# Patient Record
Sex: FEMALE | Race: BLACK OR AFRICAN AMERICAN | Employment: STUDENT | URBAN - METROPOLITAN AREA
[De-identification: names, ages, dates, MRNs, and addresses within clinical notes are randomized per-mention and may not be internally consistent; named-entity substitution may affect disease eponyms.]

---
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
## 2023-02-14 ENCOUNTER — HOSPITAL ENCOUNTER (EMERGENCY)
Facility: HOSPITAL | Age: 18
Discharge: HOME | End: 2023-02-14
Attending: EMERGENCY MEDICINE
Payer: MEDICAID

## 2023-02-14 ENCOUNTER — APPOINTMENT (OUTPATIENT)
Dept: GENERAL RADIOLOGY | Facility: HOSPITAL | Age: 18
End: 2023-02-14
Attending: EMERGENCY MEDICINE
Payer: MEDICAID

## 2023-02-14 VITALS
TEMPERATURE: 98 F | HEART RATE: 96 BPM | SYSTOLIC BLOOD PRESSURE: 121 MMHG | BODY MASS INDEX: 27 KG/M2 | OXYGEN SATURATION: 98 % | HEIGHT: 62 IN | DIASTOLIC BLOOD PRESSURE: 56 MMHG | RESPIRATION RATE: 18 BRPM

## 2023-02-14 DIAGNOSIS — R11.15 CYCLIC VOMITING SYNDROME: ICD-10-CM

## 2023-02-14 DIAGNOSIS — F12.188 CANNABIS HYPEREMESIS SYNDROME CONCURRENT WITH AND DUE TO CANNABIS ABUSE (HCC): ICD-10-CM

## 2023-02-14 DIAGNOSIS — E87.6 HYPOKALEMIA: ICD-10-CM

## 2023-02-14 DIAGNOSIS — R10.13 ABDOMINAL PAIN, EPIGASTRIC: Primary | ICD-10-CM

## 2023-02-14 LAB
ALBUMIN SERPL-MCNC: 4.9 G/DL (ref 3.4–5)
ALBUMIN/GLOB SERPL: 1.2 {RATIO} (ref 1–2)
ALP LIVER SERPL-CCNC: 108 U/L
ALT SERPL-CCNC: 29 U/L
AMPHET UR QL SCN: NEGATIVE
ANION GAP SERPL CALC-SCNC: 8 MMOL/L (ref 0–18)
AST SERPL-CCNC: 25 U/L (ref 15–37)
B-HCG UR QL: NEGATIVE
BASOPHILS # BLD AUTO: 0.03 X10(3) UL (ref 0–0.2)
BASOPHILS NFR BLD AUTO: 0.3 %
BENZODIAZ UR QL SCN: NEGATIVE
BILIRUB SERPL-MCNC: 0.8 MG/DL (ref 0.1–2)
BILIRUB UR QL STRIP.AUTO: NEGATIVE
BUN BLD-MCNC: 12 MG/DL (ref 7–18)
CALCIUM BLD-MCNC: 10 MG/DL (ref 8.5–10.1)
CHLORIDE SERPL-SCNC: 102 MMOL/L (ref 98–112)
CO2 SERPL-SCNC: 30 MMOL/L (ref 21–32)
COCAINE UR QL: NEGATIVE
COLOR UR AUTO: YELLOW
CREAT BLD-MCNC: 0.82 MG/DL
CREAT UR-SCNC: 646 MG/DL
EOSINOPHIL # BLD AUTO: 0 X10(3) UL (ref 0–0.7)
EOSINOPHIL NFR BLD AUTO: 0 %
ERYTHROCYTE [DISTWIDTH] IN BLOOD BY AUTOMATED COUNT: 13.4 %
GFR SERPLBLD BASED ON 1.73 SQ M-ARVRAT: 106 ML/MIN/1.73M2 (ref 60–?)
GLOBULIN PLAS-MCNC: 4.1 G/DL (ref 2.8–4.4)
GLUCOSE BLD-MCNC: 104 MG/DL (ref 70–99)
GLUCOSE UR STRIP.AUTO-MCNC: NEGATIVE MG/DL
HCT VFR BLD AUTO: 40.6 %
HGB BLD-MCNC: 13.7 G/DL
HYALINE CASTS #/AREA URNS AUTO: PRESENT /LPF
IMM GRANULOCYTES # BLD AUTO: 0.06 X10(3) UL (ref 0–1)
IMM GRANULOCYTES NFR BLD: 0.5 %
KETONES UR STRIP.AUTO-MCNC: 80 MG/DL
LEUKOCYTE ESTERASE UR QL STRIP.AUTO: NEGATIVE
LIPASE SERPL-CCNC: 16 U/L (ref 13–75)
LIPASE SERPL-CCNC: 67 U/L (ref 73–393)
LYMPHOCYTES # BLD AUTO: 0.89 X10(3) UL (ref 1.5–5)
LYMPHOCYTES NFR BLD AUTO: 7.5 %
MCH RBC QN AUTO: 29.3 PG (ref 26–34)
MCHC RBC AUTO-ENTMCNC: 33.7 G/DL (ref 31–37)
MCV RBC AUTO: 86.9 FL
MDMA UR QL SCN: NEGATIVE
MONOCYTES # BLD AUTO: 0.68 X10(3) UL (ref 0.1–1)
MONOCYTES NFR BLD AUTO: 5.8 %
NEUTROPHILS # BLD AUTO: 10.16 X10 (3) UL (ref 1.5–7.7)
NEUTROPHILS # BLD AUTO: 10.16 X10(3) UL (ref 1.5–7.7)
NEUTROPHILS NFR BLD AUTO: 85.9 %
NITRITE UR QL STRIP.AUTO: NEGATIVE
OPIATES UR QL SCN: NEGATIVE
OSMOLALITY SERPL CALC.SUM OF ELEC: 290 MOSM/KG (ref 275–295)
OXYCODONE UR QL SCN: NEGATIVE
PH UR STRIP.AUTO: 6 [PH] (ref 5–8)
PLATELET # BLD AUTO: 311 10(3)UL (ref 150–450)
POTASSIUM SERPL-SCNC: 3 MMOL/L (ref 3.5–5.1)
PROT SERPL-MCNC: 9 G/DL (ref 6.4–8.2)
PROT UR STRIP.AUTO-MCNC: >=500 MG/DL
RBC # BLD AUTO: 4.67 X10(6)UL
RBC UR QL AUTO: NEGATIVE
SODIUM SERPL-SCNC: 140 MMOL/L (ref 136–145)
SP GR UR STRIP.AUTO: >1.03 (ref 1–1.03)
UROBILINOGEN UR STRIP.AUTO-MCNC: 2 MG/DL
WBC # BLD AUTO: 11.8 X10(3) UL (ref 4–11)

## 2023-02-14 PROCEDURE — 83690 ASSAY OF LIPASE: CPT | Performed by: EMERGENCY MEDICINE

## 2023-02-14 PROCEDURE — 96361 HYDRATE IV INFUSION ADD-ON: CPT

## 2023-02-14 PROCEDURE — 81025 URINE PREGNANCY TEST: CPT

## 2023-02-14 PROCEDURE — C9113 INJ PANTOPRAZOLE SODIUM, VIA: HCPCS | Performed by: EMERGENCY MEDICINE

## 2023-02-14 PROCEDURE — 99285 EMERGENCY DEPT VISIT HI MDM: CPT

## 2023-02-14 PROCEDURE — 85025 COMPLETE CBC W/AUTO DIFF WBC: CPT | Performed by: EMERGENCY MEDICINE

## 2023-02-14 PROCEDURE — 80349 CANNABINOIDS NATURAL: CPT | Performed by: EMERGENCY MEDICINE

## 2023-02-14 PROCEDURE — 96375 TX/PRO/DX INJ NEW DRUG ADDON: CPT

## 2023-02-14 PROCEDURE — 80053 COMPREHEN METABOLIC PANEL: CPT | Performed by: EMERGENCY MEDICINE

## 2023-02-14 PROCEDURE — 99284 EMERGENCY DEPT VISIT MOD MDM: CPT

## 2023-02-14 PROCEDURE — 81001 URINALYSIS AUTO W/SCOPE: CPT | Performed by: EMERGENCY MEDICINE

## 2023-02-14 PROCEDURE — 80307 DRUG TEST PRSMV CHEM ANLYZR: CPT | Performed by: EMERGENCY MEDICINE

## 2023-02-14 PROCEDURE — 74019 RADEX ABDOMEN 2 VIEWS: CPT | Performed by: EMERGENCY MEDICINE

## 2023-02-14 PROCEDURE — 96374 THER/PROPH/DIAG INJ IV PUSH: CPT

## 2023-02-14 RX ORDER — HALOPERIDOL 5 MG/ML
5 INJECTION INTRAMUSCULAR ONCE
Status: COMPLETED | OUTPATIENT
Start: 2023-02-14 | End: 2023-02-14

## 2023-02-14 RX ORDER — DIPHENHYDRAMINE HYDROCHLORIDE 50 MG/ML
50 INJECTION INTRAMUSCULAR; INTRAVENOUS ONCE
Status: COMPLETED | OUTPATIENT
Start: 2023-02-14 | End: 2023-02-14

## 2023-02-14 RX ORDER — POTASSIUM CHLORIDE 1.5 G/1.77G
40 POWDER, FOR SOLUTION ORAL ONCE
Status: COMPLETED | OUTPATIENT
Start: 2023-02-14 | End: 2023-02-14

## 2023-02-14 RX ORDER — ONDANSETRON 4 MG/1
4 TABLET, ORALLY DISINTEGRATING ORAL EVERY 8 HOURS PRN
Qty: 10 TABLET | Refills: 0 | Status: SHIPPED | OUTPATIENT
Start: 2023-02-14 | End: 2023-02-21

## 2023-02-14 RX ORDER — ONDANSETRON 2 MG/ML
4 INJECTION INTRAMUSCULAR; INTRAVENOUS ONCE
Status: COMPLETED | OUTPATIENT
Start: 2023-02-14 | End: 2023-02-14

## 2023-02-15 NOTE — DISCHARGE INSTRUCTIONS
Refrain from any exposure to marijuana or any THC containing products. Zofran as needed for nausea or vomiting. Push fluids and rest.  Follow-up with PMD as needed. Return immediately if increased concerns.

## 2023-02-15 NOTE — ED INITIAL ASSESSMENT (HPI)
Pt presents to the ED with N/V x 1 week. Pt states she smoked week last week and has been sick since then. Pt states she has not smoked since last week.

## 2023-02-15 NOTE — ED QUICK NOTES
Assumed care of patient at this time, Pt resting comfortably in bed, all needs met. Will continue to monitor.

## 2023-02-19 LAB — DRUG CONFIRMATION,CANNABINOIDS: > 5000

## 2023-05-29 ENCOUNTER — APPOINTMENT (OUTPATIENT)
Dept: GENERAL RADIOLOGY | Facility: HOSPITAL | Age: 18
End: 2023-05-29
Attending: EMERGENCY MEDICINE
Payer: MEDICAID

## 2023-05-29 ENCOUNTER — HOSPITAL ENCOUNTER (EMERGENCY)
Facility: HOSPITAL | Age: 18
Discharge: HOME | End: 2023-05-29
Attending: EMERGENCY MEDICINE
Payer: MEDICAID

## 2023-05-29 VITALS
DIASTOLIC BLOOD PRESSURE: 53 MMHG | SYSTOLIC BLOOD PRESSURE: 137 MMHG | RESPIRATION RATE: 18 BRPM | TEMPERATURE: 97 F | HEART RATE: 77 BPM | OXYGEN SATURATION: 99 %

## 2023-05-29 DIAGNOSIS — E86.0 DEHYDRATION: ICD-10-CM

## 2023-05-29 DIAGNOSIS — R11.2 NAUSEA AND VOMITING IN ADULT: Primary | ICD-10-CM

## 2023-05-29 LAB
ALBUMIN SERPL-MCNC: 4.8 G/DL (ref 3.4–5)
ALBUMIN/GLOB SERPL: 1.2 {RATIO} (ref 1–2)
ALP LIVER SERPL-CCNC: 103 U/L
ALT SERPL-CCNC: 24 U/L
ANION GAP SERPL CALC-SCNC: 9 MMOL/L (ref 0–18)
AST SERPL-CCNC: 24 U/L (ref 15–37)
BASOPHILS # BLD AUTO: 0.06 X10(3) UL (ref 0–0.2)
BASOPHILS NFR BLD AUTO: 0.5 %
BILIRUB SERPL-MCNC: 0.7 MG/DL (ref 0.1–2)
BILIRUB UR QL STRIP.AUTO: NEGATIVE
BUN BLD-MCNC: 10 MG/DL (ref 7–18)
CALCIUM BLD-MCNC: 9.8 MG/DL (ref 8.5–10.1)
CHLORIDE SERPL-SCNC: 104 MMOL/L (ref 98–112)
CO2 SERPL-SCNC: 27 MMOL/L (ref 21–32)
COLOR UR AUTO: YELLOW
CREAT BLD-MCNC: 0.79 MG/DL
EOSINOPHIL # BLD AUTO: 0 X10(3) UL (ref 0–0.7)
EOSINOPHIL NFR BLD AUTO: 0 %
ERYTHROCYTE [DISTWIDTH] IN BLOOD BY AUTOMATED COUNT: 13.1 %
GFR SERPLBLD BASED ON 1.73 SQ M-ARVRAT: 110 ML/MIN/1.73M2 (ref 60–?)
GLOBULIN PLAS-MCNC: 4 G/DL (ref 2.8–4.4)
GLUCOSE BLD-MCNC: 126 MG/DL (ref 70–99)
GLUCOSE UR STRIP.AUTO-MCNC: NEGATIVE MG/DL
HCT VFR BLD AUTO: 41.4 %
HGB BLD-MCNC: 13.7 G/DL
IMM GRANULOCYTES # BLD AUTO: 0.04 X10(3) UL (ref 0–1)
IMM GRANULOCYTES NFR BLD: 0.3 %
KETONES UR STRIP.AUTO-MCNC: 20 MG/DL
LEUKOCYTE ESTERASE UR QL STRIP.AUTO: NEGATIVE
LIPASE SERPL-CCNC: 19 U/L (ref 13–75)
LYMPHOCYTES # BLD AUTO: 1.12 X10(3) UL (ref 1.5–5)
LYMPHOCYTES NFR BLD AUTO: 8.4 %
MCH RBC QN AUTO: 28.8 PG (ref 26–34)
MCHC RBC AUTO-ENTMCNC: 33.1 G/DL (ref 31–37)
MCV RBC AUTO: 87 FL
MONOCYTES # BLD AUTO: 0.37 X10(3) UL (ref 0.1–1)
MONOCYTES NFR BLD AUTO: 2.8 %
NEUTROPHILS # BLD AUTO: 11.7 X10 (3) UL (ref 1.5–7.7)
NEUTROPHILS # BLD AUTO: 11.7 X10(3) UL (ref 1.5–7.7)
NEUTROPHILS NFR BLD AUTO: 88 %
NITRITE UR QL STRIP.AUTO: NEGATIVE
OSMOLALITY SERPL CALC.SUM OF ELEC: 291 MOSM/KG (ref 275–295)
PH UR STRIP.AUTO: 6 [PH] (ref 5–8)
PLATELET # BLD AUTO: 359 10(3)UL (ref 150–450)
POTASSIUM SERPL-SCNC: 3.1 MMOL/L (ref 3.5–5.1)
PROT SERPL-MCNC: 8.8 G/DL (ref 6.4–8.2)
PROT UR STRIP.AUTO-MCNC: 100 MG/DL
RBC # BLD AUTO: 4.76 X10(6)UL
SODIUM SERPL-SCNC: 140 MMOL/L (ref 136–145)
SP GR UR STRIP.AUTO: >1.03 (ref 1–1.03)
UROBILINOGEN UR STRIP.AUTO-MCNC: 4 MG/DL
WBC # BLD AUTO: 13.3 X10(3) UL (ref 4–11)

## 2023-05-29 PROCEDURE — 80053 COMPREHEN METABOLIC PANEL: CPT | Performed by: EMERGENCY MEDICINE

## 2023-05-29 PROCEDURE — 81001 URINALYSIS AUTO W/SCOPE: CPT | Performed by: EMERGENCY MEDICINE

## 2023-05-29 PROCEDURE — 83690 ASSAY OF LIPASE: CPT | Performed by: EMERGENCY MEDICINE

## 2023-05-29 PROCEDURE — 96375 TX/PRO/DX INJ NEW DRUG ADDON: CPT

## 2023-05-29 PROCEDURE — 96361 HYDRATE IV INFUSION ADD-ON: CPT

## 2023-05-29 PROCEDURE — 74021 RADEX ABDOMEN 3+ VIEWS: CPT | Performed by: EMERGENCY MEDICINE

## 2023-05-29 PROCEDURE — 99285 EMERGENCY DEPT VISIT HI MDM: CPT

## 2023-05-29 PROCEDURE — 85025 COMPLETE CBC W/AUTO DIFF WBC: CPT | Performed by: EMERGENCY MEDICINE

## 2023-05-29 PROCEDURE — 96374 THER/PROPH/DIAG INJ IV PUSH: CPT

## 2023-05-29 RX ORDER — METOCLOPRAMIDE 10 MG/1
10 TABLET ORAL EVERY 6 HOURS PRN
Qty: 20 TABLET | Refills: 0 | Status: SHIPPED | OUTPATIENT
Start: 2023-05-29 | End: 2023-06-28

## 2023-05-29 RX ORDER — DIPHENHYDRAMINE HYDROCHLORIDE 50 MG/ML
50 INJECTION INTRAMUSCULAR; INTRAVENOUS ONCE
Status: COMPLETED | OUTPATIENT
Start: 2023-05-29 | End: 2023-05-29

## 2023-05-29 RX ORDER — ONDANSETRON 8 MG/1
8 TABLET, ORALLY DISINTEGRATING ORAL EVERY 6 HOURS PRN
Qty: 10 TABLET | Refills: 0 | Status: SHIPPED | OUTPATIENT
Start: 2023-05-29

## 2023-05-29 RX ORDER — ONDANSETRON 2 MG/ML
4 INJECTION INTRAMUSCULAR; INTRAVENOUS ONCE
Status: DISCONTINUED | OUTPATIENT
Start: 2023-05-29 | End: 2023-05-29

## 2023-05-29 RX ORDER — POTASSIUM CHLORIDE 20 MEQ/1
20 TABLET, EXTENDED RELEASE ORAL ONCE
Status: COMPLETED | OUTPATIENT
Start: 2023-05-29 | End: 2023-05-29

## 2023-05-29 RX ORDER — POTASSIUM CHLORIDE 14.9 MG/ML
20 INJECTION INTRAVENOUS ONCE
Status: DISCONTINUED | OUTPATIENT
Start: 2023-05-29 | End: 2023-05-29

## 2023-05-29 RX ORDER — ONDANSETRON 2 MG/ML
8 INJECTION INTRAMUSCULAR; INTRAVENOUS
Status: DISCONTINUED | OUTPATIENT
Start: 2023-05-29 | End: 2023-05-29

## 2023-05-29 RX ORDER — DEXTROSE, SODIUM CHLORIDE, SODIUM LACTATE, POTASSIUM CHLORIDE, AND CALCIUM CHLORIDE 5; .6; .31; .03; .02 G/100ML; G/100ML; G/100ML; G/100ML; G/100ML
INJECTION, SOLUTION INTRAVENOUS CONTINUOUS
Status: DISCONTINUED | OUTPATIENT
Start: 2023-05-29 | End: 2023-05-29

## 2023-05-29 RX ORDER — METOCLOPRAMIDE HYDROCHLORIDE 5 MG/ML
10 INJECTION INTRAMUSCULAR; INTRAVENOUS ONCE
Status: COMPLETED | OUTPATIENT
Start: 2023-05-29 | End: 2023-05-29

## 2023-05-29 NOTE — ED QUICK NOTES
Pt removed blood pressure cuff and stated she didn't like the way it felt. Pt educated on importance of monitoring vital signs while in ED. Pt declining to have vital sign monitoring in place at this time.

## 2023-05-29 NOTE — ED QUICK NOTES
Pt reports she is feeling better, no longer nauseous. Spoke with GEOVANNY regarding potassium.  Orders received from GEOVANNY.

## 2023-05-30 NOTE — DISCHARGE INSTRUCTIONS
Push fluids-sports drinks like Pedialyte or Gatorade are good choice  [place] light diet as tolerated  Alternate between Zofran and Reglan every 3-4 hours around-the-clock for the next 24 hours, then as needed  Never used marijuana    Contact your primary care doctor or the primary care doctor on-call in the morning to arrange close follow-up

## 2023-06-27 NOTE — ED QUICK NOTES
Pt noted to be drinking water out of sink in room and is now vomiting. Pt instructed not to drink water out of sink. Pt agreeable to receive IV zofran. No

## 2024-02-04 ENCOUNTER — HOSPITAL ENCOUNTER (INPATIENT)
Facility: HOSPITAL | Age: 19
LOS: 3 days | Discharge: HOME | End: 2024-02-07
Attending: PEDIATRICS | Admitting: PEDIATRICS
Payer: MEDICAID

## 2024-02-04 ENCOUNTER — APPOINTMENT (OUTPATIENT)
Dept: GENERAL RADIOLOGY | Facility: HOSPITAL | Age: 19
End: 2024-02-04
Attending: PEDIATRICS
Payer: MEDICAID

## 2024-02-04 ENCOUNTER — HOSPITAL ENCOUNTER (INPATIENT)
Facility: HOSPITAL | Age: 19
End: 2024-02-04
Attending: PEDIATRICS | Admitting: PEDIATRICS
Payer: MEDICAID

## 2024-02-04 ENCOUNTER — APPOINTMENT (OUTPATIENT)
Dept: CT IMAGING | Facility: HOSPITAL | Age: 19
End: 2024-02-04
Attending: PEDIATRICS
Payer: MEDICAID

## 2024-02-04 DIAGNOSIS — J93.11 PRIMARY SPONTANEOUS PNEUMOTHORAX: Primary | ICD-10-CM

## 2024-02-04 DIAGNOSIS — F12.90 CANNABINOID HYPEREMESIS SYNDROME: ICD-10-CM

## 2024-02-04 DIAGNOSIS — R11.2 CANNABINOID HYPEREMESIS SYNDROME: ICD-10-CM

## 2024-02-04 DIAGNOSIS — J98.2 PNEUMOMEDIASTINUM (HCC): ICD-10-CM

## 2024-02-04 LAB
ALBUMIN SERPL-MCNC: 4.8 G/DL (ref 3.4–5)
ALBUMIN/GLOB SERPL: 1.2 {RATIO} (ref 1–2)
ALP LIVER SERPL-CCNC: 84 U/L
ALT SERPL-CCNC: 23 U/L
ANION GAP SERPL CALC-SCNC: 8 MMOL/L (ref 0–18)
AST SERPL-CCNC: 17 U/L (ref 15–37)
ATRIAL RATE: 87 BPM
BASOPHILS # BLD AUTO: 0.03 X10(3) UL (ref 0–0.2)
BASOPHILS NFR BLD AUTO: 0.3 %
BILIRUB SERPL-MCNC: 1.6 MG/DL (ref 0.1–2)
BILIRUB UR QL STRIP.AUTO: NEGATIVE
BUN BLD-MCNC: 15 MG/DL (ref 9–23)
CALCIUM BLD-MCNC: 9.9 MG/DL (ref 8.5–10.1)
CHLORIDE SERPL-SCNC: 95 MMOL/L (ref 98–112)
CLARITY UR REFRACT.AUTO: CLEAR
CO2 SERPL-SCNC: 30 MMOL/L (ref 21–32)
COLOR UR AUTO: YELLOW
CREAT BLD-MCNC: 0.74 MG/DL
EGFRCR SERPLBLD CKD-EPI 2021: 119 ML/MIN/1.73M2 (ref 60–?)
EOSINOPHIL # BLD AUTO: 0 X10(3) UL (ref 0–0.7)
EOSINOPHIL NFR BLD AUTO: 0 %
ERYTHROCYTE [DISTWIDTH] IN BLOOD BY AUTOMATED COUNT: 12.5 %
FLUAV + FLUBV RNA SPEC NAA+PROBE: NEGATIVE
FLUAV + FLUBV RNA SPEC NAA+PROBE: NEGATIVE
GLOBULIN PLAS-MCNC: 4 G/DL (ref 2.8–4.4)
GLUCOSE BLD-MCNC: 96 MG/DL (ref 70–99)
GLUCOSE UR STRIP.AUTO-MCNC: NORMAL MG/DL
HCG SERPL QL: NEGATIVE
HCT VFR BLD AUTO: 45.4 %
HGB BLD-MCNC: 15.2 G/DL
IMM GRANULOCYTES # BLD AUTO: 0.04 X10(3) UL (ref 0–1)
IMM GRANULOCYTES NFR BLD: 0.4 %
KETONES UR STRIP.AUTO-MCNC: >150 MG/DL
LEUKOCYTE ESTERASE UR QL STRIP.AUTO: NEGATIVE
LIPASE SERPL-CCNC: 37 U/L (ref 13–75)
LYMPHOCYTES # BLD AUTO: 1.24 X10(3) UL (ref 1–4)
LYMPHOCYTES NFR BLD AUTO: 11.6 %
MCH RBC QN AUTO: 29.1 PG (ref 26–34)
MCHC RBC AUTO-ENTMCNC: 33.5 G/DL (ref 31–37)
MCV RBC AUTO: 86.8 FL
MONOCYTES # BLD AUTO: 0.97 X10(3) UL (ref 0.1–1)
MONOCYTES NFR BLD AUTO: 9.1 %
NEUTROPHILS # BLD AUTO: 8.42 X10 (3) UL (ref 1.5–7.7)
NEUTROPHILS # BLD AUTO: 8.42 X10(3) UL (ref 1.5–7.7)
NEUTROPHILS NFR BLD AUTO: 78.6 %
NITRITE UR QL STRIP.AUTO: NEGATIVE
OSMOLALITY SERPL CALC.SUM OF ELEC: 277 MOSM/KG (ref 275–295)
P AXIS: 20 DEGREES
P-R INTERVAL: 106 MS
PH UR STRIP.AUTO: 6 [PH] (ref 5–8)
PLATELET # BLD AUTO: 319 10(3)UL (ref 150–450)
POTASSIUM SERPL-SCNC: 3.1 MMOL/L (ref 3.5–5.1)
PROT SERPL-MCNC: 8.8 G/DL (ref 6.4–8.2)
PROT UR STRIP.AUTO-MCNC: 100 MG/DL
Q-T INTERVAL: 356 MS
QRS DURATION: 92 MS
QTC CALCULATION (BEZET): 428 MS
R AXIS: 50 DEGREES
RBC # BLD AUTO: 5.23 X10(6)UL
RBC #/AREA URNS AUTO: >10 /HPF
RSV RNA SPEC NAA+PROBE: NEGATIVE
SARS-COV-2 RNA RESP QL NAA+PROBE: NOT DETECTED
SODIUM SERPL-SCNC: 133 MMOL/L (ref 136–145)
SP GR UR STRIP.AUTO: >1.03 (ref 1–1.03)
T AXIS: 17 DEGREES
UROBILINOGEN UR STRIP.AUTO-MCNC: 2 MG/DL
VENTRICULAR RATE: 87 BPM
WBC # BLD AUTO: 10.7 X10(3) UL (ref 4–11)

## 2024-02-04 PROCEDURE — 74176 CT ABD & PELVIS W/O CONTRAST: CPT | Performed by: PEDIATRICS

## 2024-02-04 PROCEDURE — 99222 1ST HOSP IP/OBS MODERATE 55: CPT | Performed by: STUDENT IN AN ORGANIZED HEALTH CARE EDUCATION/TRAINING PROGRAM

## 2024-02-04 PROCEDURE — 71045 X-RAY EXAM CHEST 1 VIEW: CPT | Performed by: PEDIATRICS

## 2024-02-04 RX ORDER — LORAZEPAM 2 MG/ML
2 INJECTION INTRAMUSCULAR ONCE
Status: COMPLETED | OUTPATIENT
Start: 2024-02-04 | End: 2024-02-04

## 2024-02-04 RX ORDER — ONDANSETRON 2 MG/ML
4 INJECTION INTRAMUSCULAR; INTRAVENOUS ONCE
Status: COMPLETED | OUTPATIENT
Start: 2024-02-04 | End: 2024-02-04

## 2024-02-04 RX ORDER — POLYETHYLENE GLYCOL 3350 17 G/17G
17 POWDER, FOR SOLUTION ORAL DAILY PRN
Status: DISCONTINUED | OUTPATIENT
Start: 2024-02-04 | End: 2024-02-07

## 2024-02-04 RX ORDER — ECHINACEA PURPUREA EXTRACT 125 MG
1 TABLET ORAL
Status: DISCONTINUED | OUTPATIENT
Start: 2024-02-04 | End: 2024-02-07

## 2024-02-04 RX ORDER — ENEMA 19; 7 G/133ML; G/133ML
1 ENEMA RECTAL ONCE AS NEEDED
Status: DISCONTINUED | OUTPATIENT
Start: 2024-02-04 | End: 2024-02-07

## 2024-02-04 RX ORDER — ONDANSETRON 2 MG/ML
4 INJECTION INTRAMUSCULAR; INTRAVENOUS EVERY 6 HOURS PRN
Status: DISCONTINUED | OUTPATIENT
Start: 2024-02-04 | End: 2024-02-07

## 2024-02-04 RX ORDER — MELATONIN
3 NIGHTLY PRN
Status: DISCONTINUED | OUTPATIENT
Start: 2024-02-04 | End: 2024-02-07

## 2024-02-04 RX ORDER — KETOROLAC TROMETHAMINE 30 MG/ML
30 INJECTION, SOLUTION INTRAMUSCULAR; INTRAVENOUS ONCE
Status: COMPLETED | OUTPATIENT
Start: 2024-02-04 | End: 2024-02-04

## 2024-02-04 RX ORDER — ACETAMINOPHEN 500 MG
500 TABLET ORAL EVERY 4 HOURS PRN
Status: DISCONTINUED | OUTPATIENT
Start: 2024-02-04 | End: 2024-02-07

## 2024-02-04 RX ORDER — SENNOSIDES 8.6 MG
17.2 TABLET ORAL NIGHTLY PRN
Status: DISCONTINUED | OUTPATIENT
Start: 2024-02-04 | End: 2024-02-07

## 2024-02-04 RX ORDER — HEPARIN SODIUM 5000 [USP'U]/ML
5000 INJECTION, SOLUTION INTRAVENOUS; SUBCUTANEOUS EVERY 8 HOURS SCHEDULED
Status: DISCONTINUED | OUTPATIENT
Start: 2024-02-04 | End: 2024-02-07

## 2024-02-04 RX ORDER — BENZONATATE 100 MG/1
200 CAPSULE ORAL 3 TIMES DAILY PRN
Status: DISCONTINUED | OUTPATIENT
Start: 2024-02-04 | End: 2024-02-07

## 2024-02-04 RX ORDER — PROCHLORPERAZINE EDISYLATE 5 MG/ML
5 INJECTION INTRAMUSCULAR; INTRAVENOUS EVERY 8 HOURS PRN
Status: DISCONTINUED | OUTPATIENT
Start: 2024-02-04 | End: 2024-02-04

## 2024-02-04 RX ORDER — DIPHENHYDRAMINE HYDROCHLORIDE 50 MG/ML
25 INJECTION INTRAMUSCULAR; INTRAVENOUS EVERY 6 HOURS PRN
Status: DISCONTINUED | OUTPATIENT
Start: 2024-02-04 | End: 2024-02-07

## 2024-02-04 RX ORDER — BISACODYL 10 MG
10 SUPPOSITORY, RECTAL RECTAL
Status: DISCONTINUED | OUTPATIENT
Start: 2024-02-04 | End: 2024-02-07

## 2024-02-04 RX ORDER — POTASSIUM CHLORIDE 20 MEQ/1
40 TABLET, EXTENDED RELEASE ORAL ONCE
Status: COMPLETED | OUTPATIENT
Start: 2024-02-04 | End: 2024-02-04

## 2024-02-04 RX ORDER — SODIUM CHLORIDE 9 MG/ML
INJECTION, SOLUTION INTRAVENOUS CONTINUOUS
Status: DISCONTINUED | OUTPATIENT
Start: 2024-02-04 | End: 2024-02-07

## 2024-02-04 RX ORDER — MAGNESIUM GLYCINATE 100 MG
1 CAPSULE ORAL DAILY
COMMUNITY

## 2024-02-04 RX ORDER — PROCHLORPERAZINE EDISYLATE 5 MG/ML
10 INJECTION INTRAMUSCULAR; INTRAVENOUS EVERY 6 HOURS PRN
Status: DISCONTINUED | OUTPATIENT
Start: 2024-02-04 | End: 2024-02-04

## 2024-02-04 RX ORDER — METOCLOPRAMIDE HYDROCHLORIDE 5 MG/ML
5 INJECTION INTRAMUSCULAR; INTRAVENOUS EVERY 8 HOURS
Status: DISCONTINUED | OUTPATIENT
Start: 2024-02-04 | End: 2024-02-07

## 2024-02-04 NOTE — ED PROVIDER NOTES
Patient Seen in: [facility] Emergency Department      History     Chief Complaint   Patient presents with    Nausea/Vomiting/Diarrhea     Stated Complaint: vomiting and CP    Subjective:   HPI    18-year-old female with cannabinoid hyperemesis syndrome who is here with 5-day history of vomiting, about 20 times per day.  No associated fevers, diarrhea, or URI symptoms.  Does admit to smoking or vaping almost every day.  Multiple prior ED visits for the same and she was admitted in August 2022 for vomiting as well, no which was able to review.  Complaining of diffuse abdominal pain.  LMP 1.5 months ago.  No urinary or vaginal complaints.    Objective:   Past Medical History:   Diagnosis Date    Cannabis hyperemesis syndrome concurrent with and due to cannabis abuse (HCC)     Cyclic vomiting syndrome     Development delay     Failure to thrive (0-17)     Hyperemesis               History reviewed. No pertinent surgical history.             Social History     Socioeconomic History    Marital status: Single   Tobacco Use    Smoking status: Never    Smokeless tobacco: Never   Vaping Use    Vaping Use: Every day   Substance and Sexual Activity    Alcohol use: Not Currently    Drug use: Not Currently     Types: Cannabis   Social History Narrative    ** Merged History Encounter **                   Review of Systems    Positive for stated complaint: vomiting and CP  Other systems are as noted in HPI.  Constitutional and vital signs reviewed.      All other systems reviewed and negative except as noted above.    Physical Exam     ED Triage Vitals   BP 02/04/24 1203 141/88   Pulse 02/04/24 1203 (!) 130   Resp 02/04/24 1203 18   Temp 02/04/24 1203 97.8 °F (36.6 °C)   Temp src 02/04/24 1203 Temporal   SpO2 02/04/24 1204 100 %   O2 Device 02/04/24 1204 None (Room air)       Current:/88   Pulse 97   Temp 97.8 °F (36.6 °C) (Temporal)   Resp 18   Ht 160 cm (5' 3\")   Wt 67.1 kg   LMP 05/01/2023 (Approximate)    SpO2 100%   BMI 26.22 kg/m²         Physical Exam  Vitals and nursing note reviewed.   Constitutional:       General: She is in acute distress.      Appearance: She is well-developed. She is not diaphoretic.      Comments: Nauseated and vomiting in the room.   HENT:      Head: Normocephalic and atraumatic.      Nose: Nose normal.   Eyes:      Pupils: Pupils are equal, round, and reactive to light.   Cardiovascular:      Rate and Rhythm: Tachycardia present.      Heart sounds: Normal heart sounds.   Pulmonary:      Effort: Pulmonary effort is normal.   Abdominal:      General: Abdomen is flat. There is no distension.      Tenderness: There is abdominal tenderness. There is guarding. There is no rebound.      Comments: Diffuse abdominal tenderness with voluntary guarding.  No focal right lower quadrant tenderness or McBurney point pain.   Musculoskeletal:      Cervical back: Normal range of motion and neck supple.   Skin:     General: Skin is warm.      Coloration: Skin is not pale.      Findings: No rash.   Neurological:      Mental Status: She is alert and oriented to person, place, and time.      Cranial Nerves: No cranial nerve deficit.      Motor: No abnormal muscle tone.      Coordination: Coordination normal.   Psychiatric:         Behavior: Behavior normal.         Thought Content: Thought content normal.         Judgment: Judgment normal.         ED Course     Labs Reviewed   COMP METABOLIC PANEL (14) - Abnormal; Notable for the following components:       Result Value    Sodium 133 (*)     Potassium 3.1 (*)     Chloride 95 (*)     Total Protein 8.8 (*)     All other components within normal limits   URINALYSIS, ROUTINE - Abnormal; Notable for the following components:    Spec Gravity >1.030 (*)     Ketones Urine >150 (*)     Blood Urine 1+ (*)     Protein Urine 100 (*)     Urobilinogen Urine 2 (*)     RBC Urine >10 (*)     Squamous Epi. Cells Few (*)     All other components within normal limits   CBC W/  DIFFERENTIAL - Abnormal; Notable for the following components:    Neutrophil Absolute Prelim 8.42 (*)     Neutrophil Absolute 8.42 (*)     All other components within normal limits   HCG, BETA SUBUNIT, QUAL - Normal   LIPASE - Normal   CBC WITH DIFFERENTIAL WITH PLATELET    Narrative:     The following orders were created for panel order CBC With Differential With Platelet.  Procedure                               Abnormality         Status                     ---------                               -----------         ------                     CBC W/ DIFFERENTIAL[[id number]]          Abnormal            Final result                 Please view results for these tests on the individual orders.   SARS-COV-2/FLU A AND B/RSV BY PCR (GENEXPERT)     EKG    Rate, intervals and axes as noted on EKG Report.  Rate: 87  Rhythm: Sinus Rhythm  Reading: normal sinus                 Labs:  Personally reviewed any labs ordered.    Medications administered:  Medications   sodium chloride 0.9 % IV bolus 1,000 mL (0 mL Intravenous Stopped 2/4/24 1409)   ondansetron (Zofran) 4 MG/2ML injection 4 mg (4 mg Intravenous Given 2/4/24 1309)   LORazepam (Ativan) 2 mg/mL injection 2 mg (2 mg Intravenous Given 2/4/24 1309)   ketorolac (Toradol) 30 MG/ML injection 30 mg (30 mg Intravenous Given 2/4/24 1309)   sodium chloride 0.9 % IV bolus 1,000 mL (1,000 mL Intravenous New Bag 2/4/24 1418)       Pulse oximetry:  Pulse oximetry on room air is 100% and is normal.     Cardiac Monitoring:  Initial heart rate is 130 and is high for age    Vital Signs:  Vitals:    02/04/24 1203 02/04/24 1204 02/04/24 1415 02/04/24 1430   BP: 141/88      Pulse: (!) 130  106 97   Resp: 18      Temp: 97.8 °F (36.6 °C)      TempSrc: Temporal      SpO2:  100% 100% 100%   Weight:       Height:         Chart review:  Epic chart review was performed and all relevant PCP or ED visits, as well as hospitalizations, were assessed for relevance to this particular visit.   Review  of non-ED visits reviewed: noted in history            MDM      Assessment & Plan:    18 year old female with history of cannabinoid hyperemesis syndrome who presents with 5-day history of persistent abdominal pain and vomiting.  Initial exam, clearly uncomfortable, nauseous and actively vomiting.  Tachycardic 230 indicating dehydration.  Will place IV for fluid bolus and administer Ativan and Zofran.  Did offer Haldol however she has had dystonic reaction in the past so declined.  Will closely monitor reassess abdominal pain.        ^^ Independent historian: parent  ^^ Pertinent co-morbidities affecting presentation: h/o cannabinoid hyperemesis  ^^ Differential diagnoses considered/Diagnostic tests considered: noted above.   ^^ Acute or chronic illness/injury posing threat to life or bodily function: noted above.       ED Course:    Labs notable for slightly low sodium and potassium.  On reassessment, feeling improved with no further vomiting.  No complaints of right flank pain.  UA noted blood and she is not on her period, concern for possible stones will obtain CT and administer second fluid bolus.    Reassessment:  Blood pressure 141/88, pulse 97, temperature 97.8 °F (36.6 °C), temperature source Temporal, resp. rate 18, height 160 cm (5' 3\"), weight 67.1 kg, last menstrual period 05/01/2023, SpO2 100%, not currently breastfeeding.  CT no stone noted however small pneumomediastinum and pneumothorax noted at base.  On reassessment, no respiratory distress.  Did complain of some chest discomfort with vomiting.  Chest x-ray obtained and noted small pneumomediastinum, no pneumothorax noted.  Will need admission for continued monitoring and repeat chest x-ray.  Placed on nasal cannula.  Discussed with [facility], Dr. Florence for admission        Bhavin Lackey MD, 3:44 PM      Complexity of Problems Addressed: high    Risk: high      This report has been produced using speech recognition software and may  contain errors related to that system including, but not limited to, errors in grammar, punctuation, and spelling, as well as words and phrases that possibly may have been recognized inappropriately.  If there are any questions or concerns, contact the dictating provider for clarification.    NOTE: The 21st Century Cares Act makes medical notes available to patients.  Be advised that this is a medical document written in medical language and may contain abbreviations or verbiage that is unfamiliar or direct.  It is primarily intended to carry relevant historical information, physical exam findings, and the clinical assessment of the physician.    Admission disposition: 2/4/2024  3:39 PM                                        Medical Decision Making  Problems Addressed:  Cannabinoid hyperemesis syndrome: chronic illness or injury with exacerbation, progression, or side effects of treatment  Pneumomediastinum (HCC): acute illness or injury with systemic symptoms that poses a threat to life or bodily functions  Primary spontaneous pneumothorax: acute illness or injury with systemic symptoms that poses a threat to life or bodily functions    Amount and/or Complexity of Data Reviewed  Independent Historian: parent  External Data Reviewed: notes.  Labs: ordered. Decision-making details documented in ED Course.  Radiology: ordered and independent interpretation performed. Decision-making details documented in ED Course.    Risk  Decision regarding hospitalization.        Disposition and Plan     Clinical Impression:  1. Primary spontaneous pneumothorax    2. Pneumomediastinum (HCC)    3. Cannabinoid hyperemesis syndrome         Disposition:  Admit  2/4/2024  3:39 pm    Follow-up:  No follow-up provider specified.        Medications Prescribed:  Current Discharge Medication List                            Hospital Problems       Present on Admission  Date Reviewed: 8/22/2022            ICD-10-CM Noted POA     Pneumomediastinum (HCC) J98.2 2/4/2024 Unknown

## 2024-02-04 NOTE — ED INITIAL ASSESSMENT (HPI)
Pt to ED via walk in c/o vomiting that started 5 days ago, pt reports multiple episodes of vomiting per day. Pt reports pain in chest since vomiting. Denies fevers.

## 2024-02-04 NOTE — H&P
[facility]     Zully Fontanez Patient Status:  Emergency    2005 MRN [id number]   Location [facility] EMERGENCY DEPARTMENT Attending Bhavin Lackey MD   Hosp Day # 0 PCP None Pcp     Chief Complaint: N, V, pain     Subjective:    History of Present Illness:     Zully Fontanez is a 18 year old female with  h/o cannabis hyperemesis presenting with intractable N, V for one week. No fever. Pt has had chills.      History/Other:    Past Medical History:  Past Medical History:   Diagnosis Date    Cannabis hyperemesis syndrome concurrent with and due to cannabis abuse (HCC)     Cyclic vomiting syndrome     Development delay     Failure to thrive (0-17)     Hyperemesis      Past Surgical History:   History reviewed. No pertinent surgical history.   Family History:   No family history on file.  Social History:    reports that she has never smoked. She has never used smokeless tobacco. She reports that she does not currently use alcohol. She reports that she does not currently use drugs after having used the following drugs: Cannabis.     Allergies:   Allergies   Allergen Reactions    Albuterol ITCHING and SWELLING     Wheezing becomes worse      Haldol [Haloperidol] OTHER (SEE COMMENTS)     Dystonic Reaction       Medications:    No current facility-administered medications on file prior to encounter.     Current Outpatient Medications on File Prior to Encounter   Medication Sig Dispense Refill    Magnesium Glycinate 100 MG Oral Cap Take 1 capsule by mouth daily.      ondansetron 4 MG Oral Tablet Dispersible Take 1 tablet (4 mg total) by mouth every 8 (eight) hours as needed. 15 tablet 0    ondansetron 8 MG Oral Tablet Dispersible Take 1 tablet (8 mg total) by mouth every 6 (six) hours as needed for Nausea. 10 tablet 0       Review of Systems:   A comprehensive review of systems was completed.    Pertinent positives and negatives noted in the HPI.    Objective:   Physical  Exam:    /88   Pulse 97   Temp 97.8 °F (36.6 °C) (Temporal)   Resp 18   Ht 5' 3\" (1.6 m)   Wt 148 lb (67.1 kg)   LMP 05/01/2023 (Approximate)   SpO2 100%   BMI 26.22 kg/m²   General: Alert, curled up   Respiratory: No rhonchi, no wheezes  Cardiovascular: S1, S2. Regular rate and rhythm  Abdomen: Soft, Non-tender, non-distended, positive bowel sounds  Neuro: No new focal deficits  Extremities: No edema      Results:    Labs:      Labs Last 24 Hours:    Recent Labs   Lab 02/04/24  1307   RBC 5.23   HGB 15.2   HCT 45.4   MCV 86.8   MCH 29.1   MCHC 33.5   RDW 12.5   NEPRELIM 8.42*   WBC 10.7   .0       Recent Labs   Lab 02/04/24  1307   GLU 96   BUN 15   CREATSERUM 0.74   EGFRCR 119   CA 9.9   ALB 4.8   *   K 3.1*   CL 95*   CO2 30.0   ALKPHO 84   AST 17   ALT 23   BILT 1.6   TP 8.8*       No results found for: \"PT\", \"INR\"    No results for input(s): \"TROP\", \"TROPHS\", \"CK\" in the last 168 hours.    No results for input(s): \"TROP\", \"PBNP\" in the last 168 hours.    No results for input(s): \"PCT\" in the last 168 hours.    Imaging: Imaging data reviewed in Epic.    Assessment & Plan:      # Intractable N, V h/o hyperemesis   ADAL reglan  IVF  PPI  # Pneumomediastinum, ? Pneumothorax likely from intractable N,V  Supplemental O2  Supportive care  Pulm to see   Repeat CXR in AM  #HypoK, HypoNa- due to above         Plan of care discussed with pt, pt mother     Christine Florence MD    Supplementary Documentation:     The 21st Century Cures Act makes medical notes like these available to patients in the interest of transparency. Please be advised this is a medical document. Medical documents are intended to carry relevant information, facts as evident, and the clinical opinion of the practitioner. The medical note is intended as peer to peer communication and may appear blunt or direct. It is written in medical language and may contain abbreviations or verbiage that are unfamiliar.

## 2024-02-05 ENCOUNTER — APPOINTMENT (OUTPATIENT)
Dept: GENERAL RADIOLOGY | Facility: HOSPITAL | Age: 19
End: 2024-02-05
Attending: STUDENT IN AN ORGANIZED HEALTH CARE EDUCATION/TRAINING PROGRAM
Payer: MEDICAID

## 2024-02-05 LAB
ANION GAP SERPL CALC-SCNC: 5 MMOL/L (ref 0–18)
BASOPHILS # BLD AUTO: 0.04 X10(3) UL (ref 0–0.2)
BASOPHILS NFR BLD AUTO: 0.7 %
BUN BLD-MCNC: 8 MG/DL (ref 9–23)
CALCIUM BLD-MCNC: 8.4 MG/DL (ref 8.5–10.1)
CHLORIDE SERPL-SCNC: 107 MMOL/L (ref 98–112)
CO2 SERPL-SCNC: 28 MMOL/L (ref 21–32)
CREAT BLD-MCNC: 0.65 MG/DL
EGFRCR SERPLBLD CKD-EPI 2021: 129 ML/MIN/1.73M2 (ref 60–?)
EOSINOPHIL # BLD AUTO: 0.08 X10(3) UL (ref 0–0.7)
EOSINOPHIL NFR BLD AUTO: 1.5 %
ERYTHROCYTE [DISTWIDTH] IN BLOOD BY AUTOMATED COUNT: 12.5 %
GLUCOSE BLD-MCNC: 91 MG/DL (ref 70–99)
HCT VFR BLD AUTO: 33.4 %
HGB BLD-MCNC: 11 G/DL
IMM GRANULOCYTES # BLD AUTO: 0.02 X10(3) UL (ref 0–1)
IMM GRANULOCYTES NFR BLD: 0.4 %
LYMPHOCYTES # BLD AUTO: 2.3 X10(3) UL (ref 1–4)
LYMPHOCYTES NFR BLD AUTO: 41.8 %
MCH RBC QN AUTO: 29.1 PG (ref 26–34)
MCHC RBC AUTO-ENTMCNC: 32.9 G/DL (ref 31–37)
MCV RBC AUTO: 88.4 FL
MONOCYTES # BLD AUTO: 0.49 X10(3) UL (ref 0.1–1)
MONOCYTES NFR BLD AUTO: 8.9 %
NEUTROPHILS # BLD AUTO: 2.57 X10 (3) UL (ref 1.5–7.7)
NEUTROPHILS # BLD AUTO: 2.57 X10(3) UL (ref 1.5–7.7)
NEUTROPHILS NFR BLD AUTO: 46.7 %
OSMOLALITY SERPL CALC.SUM OF ELEC: 288 MOSM/KG (ref 275–295)
PLATELET # BLD AUTO: 212 10(3)UL (ref 150–450)
POTASSIUM SERPL-SCNC: 3.7 MMOL/L (ref 3.5–5.1)
POTASSIUM SERPL-SCNC: 3.7 MMOL/L (ref 3.5–5.1)
RBC # BLD AUTO: 3.78 X10(6)UL
SODIUM SERPL-SCNC: 140 MMOL/L (ref 136–145)
WBC # BLD AUTO: 5.5 X10(3) UL (ref 4–11)

## 2024-02-05 PROCEDURE — 99232 SBSQ HOSP IP/OBS MODERATE 35: CPT | Performed by: STUDENT IN AN ORGANIZED HEALTH CARE EDUCATION/TRAINING PROGRAM

## 2024-02-05 PROCEDURE — 71045 X-RAY EXAM CHEST 1 VIEW: CPT | Performed by: STUDENT IN AN ORGANIZED HEALTH CARE EDUCATION/TRAINING PROGRAM

## 2024-02-05 PROCEDURE — 99223 1ST HOSP IP/OBS HIGH 75: CPT | Performed by: INTERNAL MEDICINE

## 2024-02-05 NOTE — CONSULTS
[facility] Pulmonary Consult Note       Zully Fontanez Patient Status:  Inpatient    2005 MRN [id number]   Location [facility] 3NE-A Attending Christine Florence MD   Hosp Day # 1 PCP None Pcp     Reason for Consultation:  pneumomediastinum    History of Present Illness:  Zully Fontanez is a a(n) 18 year old female who rpesents for intractable nausea/vomiting - reportedly over 20x in the past 24 hours.  Patient denies any fevers or chills.  No diarrhea.  Patient smokes marijuana regularly.  Workup in ER showed an XR with pneumomediastinum.  Pulmonary consulted for evaluation.    History:  Past Medical History:   Diagnosis Date    Cannabis hyperemesis syndrome concurrent with and due to cannabis abuse (HCC)     Cyclic vomiting syndrome     Development delay     Failure to thrive (0-17)     Hyperemesis      History reviewed. No pertinent surgical history.  History reviewed. No pertinent family history.   reports that she has never smoked. She uses smokeless tobacco. She reports that she does not currently use alcohol. She reports that she does not currently use drugs after having used the following drugs: Cannabis.    Allergies:  Allergies   Allergen Reactions    Albuterol ITCHING and SWELLING     Wheezing becomes worse      Haldol [Haloperidol] OTHER (SEE COMMENTS)     Dystonic Reaction       Medications:    Current Facility-Administered Medications:     sodium chloride 0.9% infusion, , Intravenous, Continuous    heparin (Porcine) 5000 UNIT/ML injection 5,000 Units, 5,000 Units, Subcutaneous, Q8H ADAL    acetaminophen (Tylenol Extra Strength) tab 500 mg, 500 mg, Oral, Q4H PRN    melatonin tab 3 mg, 3 mg, Oral, Nightly PRN    ondansetron (Zofran) 4 MG/2ML injection 4 mg, 4 mg, Intravenous, Q6H PRN    polyethylene glycol (PEG 3350) (Miralax) 17 g oral packet 17 g, 17 g, Oral, Daily PRN    sennosides (Senokot) tab 17.2 mg, 17.2 mg, Oral, Nightly PRN    bisacodyl (Dulcolax) 10 MG rectal suppository  10 mg, 10 mg, Rectal, Daily PRN    fleet enema (Fleet) 7-19 GM/118ML rectal enema 133 mL, 1 enema, Rectal, Once PRN    benzonatate (Tessalon) cap 200 mg, 200 mg, Oral, TID PRN    glycerin-hypromellose- (Artifical Tears) 0.2-0.2-1 % ophthalmic solution 1 drop, 1 drop, Both Eyes, QID PRN    sodium chloride (Saline Mist) 0.65 % nasal solution 1 spray, 1 spray, Each Nare, Q3H PRN    pantoprazole (Protonix) 40 mg in sodium chloride 0.9% PF 10 mL IV push, 40 mg, Intravenous, Q12H    diphenhydrAMINE (Benadryl) 50 mg/mL  injection 25 mg, 25 mg, Intravenous, Q6H PRN    metoclopramide (Reglan) 5 mg/mL injection 5 mg, 5 mg, Intravenous, Q8H    Review of Systems:   All other review of systems are negative.    Vital signs in last 24 hours:  Patient Vitals for the past 24 hrs:   BP Temp Temp src Pulse Resp SpO2 Weight   02/05/24 1130 119/53 98.2 °F (36.8 °C) Oral 88 18 100 % --   02/05/24 0800 123/71 98.5 °F (36.9 °C) Oral 80 16 100 % --   02/05/24 0418 112/68 98.3 °F (36.8 °C) Oral 76 14 98 % --   02/04/24 2355 110/60 98.1 °F (36.7 °C) Oral 86 16 99 % --   02/04/24 2217 -- -- -- -- -- -- 147 lb 14.9 oz (67.1 kg)   02/04/24 2019 122/81 98.3 °F (36.8 °C) Oral 75 16 97 % --   02/04/24 1821 -- -- -- 96 -- 97 % --   02/04/24 1820 -- -- -- 88 -- 99 % --   02/04/24 1819 116/52 98.6 °F (37 °C) Oral 87 18 99 % --   02/04/24 1818 -- -- -- 93 -- 97 % --   02/04/24 1817 -- -- -- 87 -- 99 % --   02/04/24 1816 -- -- -- 101 -- 100 % --   02/04/24 1719 -- -- -- -- -- -- 147 lb 14.9 oz (67.1 kg)   02/04/24 1700 116/54 -- -- 90 -- 99 % --   02/04/24 1645 -- -- -- 91 -- 98 % --   02/04/24 1630 -- -- -- 93 14 99 % --   02/04/24 1430 -- -- -- 97 -- 100 % --   02/04/24 1415 -- -- -- 106 -- 100 % --       Intake/Output:    Intake/Output Summary (Last 24 hours) at 2/5/2024 1317  Last data filed at 2/4/2024 1518  Gross per 24 hour   Intake 2000 ml   Output --   Net 2000 ml       Physical Exam:   General: alert, cooperative, oriented.  No  respiratory distress.  Appears nauseated.   Head: Normocephalic, without obvious abnormality, atraumatic.   Eyes: Conjunctivae/corneas clear.  No scleral icterus.  No conjunctival     hemorrhage.   Nose: Nares normal.   Throat: Lips, mucosa, and tongue normal.  No thrush noted.   Neck: Soft, supple neck; trachea midline, no adenopathy, no thyromegaly.   Lungs: clear to auscultation bilaterally   Chest wall: No tenderness or deformity.   Heart: Regular rate and rhythm, normal S1S2, no murmur.   Abdomen: soft, non-tender, non-distended, no masses, no guarding, no     rebound, positive BS.   Extremity: no edema, no cyanosis   Skin: No rashes or lesions.   Neurological: Alert, interactive, no focal deficits    Lab Data Review:  Recent Labs   Lab 02/04/24  1307 02/05/24  0842   WBC 10.7 5.5   HGB 15.2 11.0*   HCT 45.4 33.4*   .0 212.0       Recent Labs   Lab 02/04/24  1307 02/05/24  0842   * 140   K 3.1* 3.7  3.7   CL 95* 107   CO2 30.0 28.0   BUN 15 8*   ALT 23  --    AST 17  --        No results for input(s): \"MG\" in the last 168 hours.    No results found for: \"PHOS\", \"PHOSPHORUS\"     No results for input(s): \"PT\", \"INR\", \"PTT\" in the last 168 hours.    No results for input(s): \"ABGPHT\", \"AGDAOM1W\", \"LWTIW6O\", \"ABGHCO3\", \"ABGBE\", \"TEMP\", \"KAYCEE\", \"SITE\", \"DEV\", \"THGB\" in the last 168 hours.    Invalid input(s): \"QDU47PKZ\", \"CHOB\"    Invalid input(s): \"CKTOTAL\", \"TROPONINI\", \"TROPONINT\", \"CKMBINDEX\"      Cultures:   No results found for this visit on 02/04/24.    Radiology:  XR CHEST AP PORTABLE  (CPT=71045)  Narrative: PROCEDURE:  XR CHEST AP PORTABLE  (CPT=71045)     TECHNIQUE:  AP chest radiograph was obtained.     COMPARISON:  [facility] , CT, CT ABDOMEN+PELVIS KIDNEYSTONE 2D RNDR(NO IV,NO ORAL)(CPT=74176), 2/04/2024, 2:38 PM.  [facility] , XR, XR CHEST AP PORTABLE  (CPT=71045), 2/04/2024, 3:12 PM.     INDICATIONS:  vomiting and CP     PATIENT STATED HISTORY: (As transcribed by Technologist)            FINDINGS:  Stable pneumomediastinum and pneumopericardium.  Stable small amount of air attenuation in the deep soft tissues of the lower neck.  Normal heart size and pulmonary vascularity.  No pleural effusion.  No significant pneumothorax identified.                   Impression: CONCLUSION:  Stable pneumomediastinum and pneumopericardium.  Stable small amount of air attenuation in the deep soft tissues of the lower neck.          LOCATION:  [id number]                 Dictated by (CST): Eric Diaz MD on 2/05/2024 at 6:46 AM       Finalized by (CST): Eric Diaz MD on 2/05/2024 at 6:48 AM         Assessment:  Pneumomediastinum, CXR reveiwed I do not see evidence of pneumothorax, likely associated with intractable vomiting  Intractable nausea/vomiting in the setting of above      Plan:  Will plan to get CT chest to evaluate pneumomediastinum as well as any potential pneumothorax  Management in the absence of pneumothorax would be supportive, avoiding positive pressure  Encouraged to take hot shower as this can help in hyperemesis syndrome  Nausea management per primary      Thank you for the consultation.  Will follow with you.    Letty Marie MD  [facility]  Office: [phone]

## 2024-02-05 NOTE — DISCHARGE SUMMARY
[place] HOSPITALIST  DISCHARGE SUMMARY     Zully Fontanez Patient Status:  Inpatient    2005 MRN [id number]   Location [facility] 3NE-A Attending Christine Florence MD   Hosp Day # 1 PCP None Pcp     Date of Admission: 2024  Date of Discharge:   24    Discharge Disposition: Home or Self Care    Discharge Diagnosis:  # Intractable N, V h/o hyperemesis   ADAL reglan  IVF  PPI  # Pneumomediastinum, ? Pneumothorax likely from intractable N,V  Supplemental O2  Supportive care  Pulm to see   Repeat CXR in AM  #HypoK, HypoNa- due to above     History of Present Illness:   Zully Fontanez is a 18 year old female with  h/o cannabis hyperemesis presenting with intractable N, V for one week. No fever. Pt has had chills.      Brief Synopsis:   Pt admitted given ADAL reglan with resolution of N, V- pt tolerating PO. Imaging with pneumomediastinum - pulm consulted.    Lace+ Score: 16  59-90 High Risk  29-58 Medium Risk  0-28   Low Risk       TCM Follow-Up Recommendation:  LACE 29-58: Moderate Risk of readmission after discharge from the hospital.      Procedures during hospitalization:   no    Incidental or significant findings and recommendations (brief descriptions):  no    Lab/Test results pending at Discharge:   no    Consultants:  Pulm     Discharge Medication List:     Discharge Medications        CONTINUE taking these medications        Instructions Prescription details   Magnesium Glycinate 100 MG Caps      Take 1 capsule by mouth daily.   Refills: 0     ondansetron 8 MG Tbdp  Commonly known as: Zofran-ODT      Take 1 tablet (8 mg total) by mouth every 6 (six) hours as needed for Nausea.   Quantity: 10 tablet  Refills: 0     ondansetron 4 MG Tbdp  Commonly known as: Zofran-ODT      Take 1 tablet (4 mg total) by mouth every 8 (eight) hours as needed.   Quantity: 15 tablet  Refills: 0              [place]     Follow-up appointment:   No follow-up provider specified.  Appointments for Next  30 Days 2024 - 3/6/2024      None            Vital signs:  Temp:  [98.1 °F (36.7 °C)-98.6 °F (37 °C)] 98.2 °F (36.8 °C)  Pulse:  [] 88  Resp:  [14-18] 18  BP: (110-123)/(52-81) 119/53  SpO2:  [97 %-100 %] 100 %    Physical Exam:    General: No acute distress   Lungs: clear to auscultation  Cardiovascular: S1, S2  Abdomen: Soft      -----------------------------------------------------------------------------------------------  PATIENT DISCHARGE INSTRUCTIONS: See electronic chart    Christine Florence MD    Total time spent on discharge plannin minutes     The  [employer] Cures Act makes medical notes like these available to patients in the interest of transparency. Please be advised this is a medical document. Medical documents are intended to carry relevant information, facts as evident, and the clinical opinion of the practitioner. The medical note is intended as peer to peer communication and may appear blunt or direct. It is written in medical language and may contain abbreviations or verbiage that are unfamiliar.

## 2024-02-05 NOTE — PROGRESS NOTES
02/04/24 1852   Provider Notification   Reason for Communication New consult   Provider Name Addi Pratt MD   Method of Communication Secure Messaging   Response Waiting for response   Notification Time 1851

## 2024-02-05 NOTE — PROGRESS NOTES
NURSING ADMISSION NOTE      Patient admitted via Ambulatory  Oriented to room.  Safety precautions initiated.  Bed in low position.  Call light in reach.    Admitted from ED for N/V at home for about 5 days. Quit smoking 20 days ago. These symptoms are not atypical for patient.   Admission navigator completed by this RN.   A&Ox4.   Room air.   NSR.  Continent.   Ambulatory.   Clear liquid diet, AST. Lemon ice, ice chips, and apple juice provided. Tolerating well.   Meds per MAR. No nausea since admitted to the unit.   Pulm to see- pneumo on CXR. Repeat CXR in AM.   Updated on POC.

## 2024-02-05 NOTE — PLAN OF CARE
Assumed patient care at 730. A/Ox4. 6L nasal cannula due to pneumo, pt educated on the importance of keeping O2 on. Advanced diet to low fber soft, tolerated Fulls. No episodes of n/v this shift. All safety precaitons are in place and will continue with plan of care    Problem: RESPIRATORY - ADULT  Goal: Achieves optimal ventilation and oxygenation  Description: INTERVENTIONS:  - Assess for changes in respiratory status  - Assess for changes in mentation and behavior  - Position to facilitate oxygenation and minimize respiratory effort  - Oxygen supplementation based on oxygen saturation or ABGs  - Provide Smoking Cessation handout, if applicable  - Encourage broncho-pulmonary hygiene including cough, deep breathe, Incentive Spirometry  - Assess the need for suctioning and perform as needed  - Assess and instruct to report SOB or any respiratory difficulty  - Respiratory Therapy support as indicated  - Manage/alleviate anxiety  - Monitor for signs/symptoms of CO2 retention  Outcome: Progressing     Problem: METABOLIC/FLUID AND ELECTROLYTES - ADULT  Goal: Electrolytes maintained within normal limits  Description: INTERVENTIONS:  - Monitor labs and rhythm and assess patient for signs and symptoms of electrolyte imbalances  - Administer electrolyte replacement as ordered  - Monitor response to electrolyte replacements, including rhythm and repeat lab results as appropriate  - Fluid restriction as ordered  - Instruct patient on fluid and nutrition restrictions as appropriate  Outcome: Progressing

## 2024-02-05 NOTE — PLAN OF CARE
Assumed patient care at 1930. A&Ox4. Family at bedside at beginning of shift. Remains on 4L of oxygen via nasal cannula. Lungs clear and diminished. Denies ALBERTO, shortness of breath, or cough. Plan is for patient to have a repeat chest xray in the AM. Abdomen soft and tender, BS active. Currently denies N/V/D- receiving scheduled IV Reglan Q8H. Tolerating clear liquid diet. IVF infusing- 0.9 NS @ 100 mL/hr. NSR on tele. K+ 3.1- replaced per protocol, see MAR. Repeat potassium level to be drawn in the morning. Up ad casimiro, voiding with out difficulty. Needs met. Will monitor.      Problem: Patient/Family Goals  Goal: Patient/Family Long Term Goal  Description: Patient's Long Term Goal: to discharge    Interventions:  -   - See additional Care Plan goals for specific interventions  Outcome: Progressing  Goal: Patient/Family Short Term Goal  Description: Patient's Short Term Goal: for the nausea to subside    Interventions:   - scheduled reglan q8h  - See additional Care Plan goals for specific interventions  Outcome: Progressing     Problem: RESPIRATORY - ADULT  Goal: Achieves optimal ventilation and oxygenation  Description: INTERVENTIONS:  - Assess for changes in respiratory status  - Assess for changes in mentation and behavior  - Position to facilitate oxygenation and minimize respiratory effort  - Oxygen supplementation based on oxygen saturation or ABGs  - Provide Smoking Cessation handout, if applicable  - Encourage broncho-pulmonary hygiene including cough, deep breathe, Incentive Spirometry  - Assess the need for suctioning and perform as needed  - Assess and instruct to report SOB or any respiratory difficulty  - Respiratory Therapy support as indicated  - Manage/alleviate anxiety  - Monitor for signs/symptoms of CO2 retention  Outcome: Progressing     Problem: GASTROINTESTINAL - ADULT  Goal: Minimal or absence of nausea and vomiting  Description: INTERVENTIONS:  - Maintain adequate hydration with IV or PO as  ordered and tolerated  - Nasogastric tube to low intermittent suction as ordered  - Evaluate effectiveness of ordered antiemetic medications  - Provide nonpharmacologic comfort measures as appropriate  - Advance diet as tolerated, if ordered  - Obtain nutritional consult as needed  - Evaluate fluid balance  Outcome: Progressing     Problem: METABOLIC/FLUID AND ELECTROLYTES - ADULT  Goal: Electrolytes maintained within normal limits  Description: INTERVENTIONS:  - Monitor labs and rhythm and assess patient for signs and symptoms of electrolyte imbalances  - Administer electrolyte replacement as ordered  - Monitor response to electrolyte replacements, including rhythm and repeat lab results as appropriate  - Fluid restriction as ordered  - Instruct patient on fluid and nutrition restrictions as appropriate  Outcome: Progressing

## 2024-02-06 ENCOUNTER — APPOINTMENT (OUTPATIENT)
Dept: CT IMAGING | Facility: HOSPITAL | Age: 19
End: 2024-02-06
Attending: INTERNAL MEDICINE
Payer: MEDICAID

## 2024-02-06 LAB
ALBUMIN SERPL-MCNC: 3.3 G/DL (ref 3.4–5)
ALBUMIN/GLOB SERPL: 1.2 {RATIO} (ref 1–2)
ALP LIVER SERPL-CCNC: 58 U/L
ALT SERPL-CCNC: 15 U/L
ANION GAP SERPL CALC-SCNC: 5 MMOL/L (ref 0–18)
AST SERPL-CCNC: 6 U/L (ref 15–37)
ATRIAL RATE: 73 BPM
BILIRUB SERPL-MCNC: 0.6 MG/DL (ref 0.1–2)
BUN BLD-MCNC: 3 MG/DL (ref 9–23)
CALCIUM BLD-MCNC: 8.5 MG/DL (ref 8.5–10.1)
CHLORIDE SERPL-SCNC: 106 MMOL/L (ref 98–112)
CO2 SERPL-SCNC: 28 MMOL/L (ref 21–32)
CREAT BLD-MCNC: 0.65 MG/DL
EGFRCR SERPLBLD CKD-EPI 2021: 129 ML/MIN/1.73M2 (ref 60–?)
GLOBULIN PLAS-MCNC: 2.8 G/DL (ref 2.8–4.4)
GLUCOSE BLD-MCNC: 104 MG/DL (ref 70–99)
MAGNESIUM SERPL-MCNC: 2 MG/DL (ref 1.6–2.6)
OSMOLALITY SERPL CALC.SUM OF ELEC: 285 MOSM/KG (ref 275–295)
P AXIS: 36 DEGREES
P-R INTERVAL: 110 MS
POTASSIUM SERPL-SCNC: 2.8 MMOL/L (ref 3.5–5.1)
POTASSIUM SERPL-SCNC: 3.2 MMOL/L (ref 3.5–5.1)
POTASSIUM SERPL-SCNC: 3.3 MMOL/L (ref 3.5–5.1)
PROT SERPL-MCNC: 6.1 G/DL (ref 6.4–8.2)
Q-T INTERVAL: 400 MS
QRS DURATION: 94 MS
QTC CALCULATION (BEZET): 440 MS
R AXIS: 53 DEGREES
SODIUM SERPL-SCNC: 139 MMOL/L (ref 136–145)
T AXIS: 29 DEGREES
VENTRICULAR RATE: 73 BPM

## 2024-02-06 PROCEDURE — 99232 SBSQ HOSP IP/OBS MODERATE 35: CPT | Performed by: INTERNAL MEDICINE

## 2024-02-06 PROCEDURE — 99232 SBSQ HOSP IP/OBS MODERATE 35: CPT | Performed by: STUDENT IN AN ORGANIZED HEALTH CARE EDUCATION/TRAINING PROGRAM

## 2024-02-06 PROCEDURE — 71250 CT THORAX DX C-: CPT | Performed by: INTERNAL MEDICINE

## 2024-02-06 RX ORDER — ONDANSETRON 4 MG/1
8 TABLET, ORALLY DISINTEGRATING ORAL EVERY 4 HOURS PRN
Qty: 30 TABLET | Refills: 0 | Status: SHIPPED | OUTPATIENT
Start: 2024-02-06

## 2024-02-06 RX ORDER — POTASSIUM CHLORIDE 14.9 MG/ML
20 INJECTION INTRAVENOUS ONCE
Status: COMPLETED | OUTPATIENT
Start: 2024-02-06 | End: 2024-02-06

## 2024-02-06 RX ORDER — METOCLOPRAMIDE 5 MG/1
5 TABLET ORAL EVERY 8 HOURS PRN
Qty: 30 TABLET | Refills: 0 | Status: SHIPPED | OUTPATIENT
Start: 2024-02-06

## 2024-02-06 NOTE — PROGRESS NOTES
[facility] Pulmonary Progress Note    Zully Fontanez Patient Status:  Inpatient    2005 MRN [id number]   Location [facility] 3NE-A Attending Christine Florence MD   Hosp Day # 2 PCP None Pcp     Subjective:  Zully Fontanez is a(n) 18 year old female who is admitted for intractable n/v.    Overnight: feeling much better today, mild fever, CT performed this morning    Objective:  /71 (BP Location: Left arm)   Pulse 92   Temp 99.1 °F (37.3 °C) (Oral)   Resp 16   Ht 5' 3\" (1.6 m)   Wt 147 lb 14.9 oz (67.1 kg)   LMP 2023 (Approximate)   SpO2 99%   BMI 26.20 kg/m²       Temp (24hrs), Av.7 °F (37.1 °C), Min:98 °F (36.7 °C), Max:99.5 °F (37.5 °C)      Intake/Output:    Intake/Output Summary (Last 24 hours) at 2024 1340  Last data filed at 2024 0804  Gross per 24 hour   Intake 4120 ml   Output 1 ml   Net 4119 ml       Physical Exam:   General: alert, cooperative, oriented.  No respiratory distress.   Head: Normocephalic, without obvious abnormality, atraumatic.   Throat: Lips, mucosa, and tongue normal.  No thrush noted.   Neck: trachea midline, no adenopathy, no thyromegaly. No JVD.   Lungs: clear to auscultation bilaterally   Chest wall: No tenderness or deformity.   Heart: regular rate and rhythm, S1, S2 normal, no murmur, click, rub or gallop   Abdomen: soft, non-distended, no masses, no guarding, no     Rebound.   Extremity: No edema or cyanosis   Skin: No rashes or lesions.   Neurological: Alert, interactive, no focal deficits    Lab Data Review:  Recent Labs     24  1307 24  0842   WBC 10.7 5.5   HGB 15.2 11.0*   .0 212.0     Recent Labs     24  1307 24  0842 24  0320   * 140 139   K 3.1* 3.7  3.7 2.8*   CL 95* 107 106   CO2 30.0 28.0 28.0   BUN 15 8* 3*   CREATSERUM 0.74 0.65 0.65     No results for input(s): \"PTP\", \"INR\", \"PTT\" in the last 168 hours.    Cultures:   No results found for this visit on 24.    Radiology:  CT  CHEST (TTY=39973)  Narrative: PROCEDURE:  CT CHEST (CPT=71250)     COMPARISON:  None.     INDICATIONS:  pneumomediastinum r/o ptx     TECHNIQUE:  Unenhanced multislice CT scanning is performed through the chest.  Dose reduction techniques were used. Dose information is transmitted to the ACR (American College of Radiology) NRDR (National Radiology Data Registry) which includes the Dose   Index Registry.     PATIENT STATED HISTORY: (As transcribed by Technologist)  Pneumomediastinum r/o ptx.         FINDINGS:  Please note that evaluation is limited without intravenous contrast.  LUNGS:  Central airways appear patent.  No bronchiectasis or peribronchial thickening is present.  There is a nodular area noted of the right lung apex measuring up to 7 x 8 mm most likely representing a partially calcified granuloma.  VASCULATURE:  Pulmonary vessels are unremarkable within the limits of a noncontrast CT.    KARLEY:  No mass or adenopathy.    MEDIASTINUM:  Pneumomediastinum is present.  This is stable from prior exams accounting for differences in technique.  CARDIAC:  No enlargement, pericardial thickening, or significant coronary artery calcification.  PLEURA:  No pneumothorax is seen.  THORACIC AORTA:  Unremarkable as seen on non-contrast imaging.   CHEST WALL:  No mass or axillary adenopathy.    LIMITED ABDOMEN:  Limited images of the upper abdomen are unremarkable.    BONES:  No bony lesion or fracture.                     Impression: CONCLUSION:  Moderate pneumomediastinum is present and is grossly stable from prior exams accounting for differences in technique.  There is no evidence of pneumothorax.     There is a nodular area of the right lung apex which is partially calcified and most likely represents a granuloma.  Consider attention this region on follow-up exams.     LOCATION:  [id number]        Dictated by (CST): Ned Villela MD on 2/06/2024 at 8:11 AM       Finalized by (CST): Ned Villela MD on 2/06/2024 at 8:18 AM          Medications reviewed     Assessment and Plan:   Patient Active Problem List   Diagnosis    Intractable nausea and vomiting    Cyclic vomiting syndrome    Hypokalemia    Cannabis hyperemesis syndrome concurrent with and due to cannabis abuse (HCC)    Marijuana use    Episode of recurrent major depressive disorder (HCC)    Pneumomediastinum (HCC)    Primary spontaneous pneumothorax    Cannabinoid hyperemesis syndrome     Assessment:  Pneumomediastinum, CXR reveiwed I do not see evidence of pneumothorax, likely associated with intractable vomiting, CT with moderate pneumomediastinum without pneumothorax  Intractable nausea/vomiting in the setting of above        Plan:  Overall much improved  Management of pneumomediastinum, avoiding positive pressure or further inhalation  Would like to monitor for 1 more day and get XR in morning, if stable can go home  Strongly encouraged not to smoke/use marijuana anymore  Nausea management per primary      Letty Marie MD  [place] [facility]  Office: [phone]

## 2024-02-06 NOTE — PLAN OF CARE
Pt is A&O x4. VSS-NSR on tele. 4L O2 (patient intermittently removes O2). Lungs diminished but pt denies SOB. IVF- 0.9NS @ 100mL/hr infusing to L forearm PIV. Scheduled reglan given as ordered. Up ad casimiro. Pt trialed low fiber/soft diet but had increased nausea/discomfort. Decreased diet back to FLD. Tolerating well.   Plan for CT chest.   Pulm following case.     0300- Pt had burst of SVT with HR in 140's. Pt c/o chest pain. EKG obtained- NSR. MD notified. CMP and mag ordered.   0435- K+ 2.8- initiated replacement per electrolyte protocol.     Problem: Patient/Family Goals  Goal: Patient/Family Long Term Goal  Description: Patient's Long Term Goal: to discharge    Interventions:  -   - See additional Care Plan goals for specific interventions  Outcome: Progressing  Goal: Patient/Family Short Term Goal  Description: Patient's Short Term Goal: for the nausea to subside    Interventions:   - scheduled reglan q8h  - See additional Care Plan goals for specific interventions  Outcome: Progressing     Problem: RESPIRATORY - ADULT  Goal: Achieves optimal ventilation and oxygenation  Description: INTERVENTIONS:  - Assess for changes in respiratory status  - Assess for changes in mentation and behavior  - Position to facilitate oxygenation and minimize respiratory effort  - Oxygen supplementation based on oxygen saturation or ABGs  - Provide Smoking Cessation handout, if applicable  - Encourage broncho-pulmonary hygiene including cough, deep breathe, Incentive Spirometry  - Assess the need for suctioning and perform as needed  - Assess and instruct to report SOB or any respiratory difficulty  - Respiratory Therapy support as indicated  - Manage/alleviate anxiety  - Monitor for signs/symptoms of CO2 retention  Outcome: Progressing     Problem: GASTROINTESTINAL - ADULT  Goal: Minimal or absence of nausea and vomiting  Description: INTERVENTIONS:  - Maintain adequate hydration with IV or PO as ordered and tolerated  -  Nasogastric tube to low intermittent suction as ordered  - Evaluate effectiveness of ordered antiemetic medications  - Provide nonpharmacologic comfort measures as appropriate  - Advance diet as tolerated, if ordered  - Obtain nutritional consult as needed  - Evaluate fluid balance  Outcome: Progressing     Problem: METABOLIC/FLUID AND ELECTROLYTES - ADULT  Goal: Electrolytes maintained within normal limits  Description: INTERVENTIONS:  - Monitor labs and rhythm and assess patient for signs and symptoms of electrolyte imbalances  - Administer electrolyte replacement as ordered  - Monitor response to electrolyte replacements, including rhythm and repeat lab results as appropriate  - Fluid restriction as ordered  - Instruct patient on fluid and nutrition restrictions as appropriate  Outcome: Progressing

## 2024-02-06 NOTE — PROGRESS NOTES
[facility]     Hospitalist Progress Note     Zullylewis Fontanez Patient Status:  Inpatient    2005 MRN [id number]   [facility] 3NE-A Attending Christine Florence MD   Hosp Day # 2 PCP None Pcp     Chief Complaint: N, V     Subjective:     Patient  reports having pain with breathing at times. N, V subsided.       Objective:    Review of Systems:   A comprehensive review of systems was completed; pertinent positive and negatives stated in subjective.    Vital signs:  Temp:  [98 °F (36.7 °C)-99.5 °F (37.5 °C)] 98 °F (36.7 °C)  Pulse:  [73-90] 83  Resp:  [16-20] 16  BP: (119-155)/(53-92) 134/82  SpO2:  [99 %-100 %] 99 %    Physical Exam:    General: No acute distress  Respiratory: No wheezes, no rhonchi  Cardiovascular: S1, S2, regular rate and rhythm  Abdomen: Soft, Non-tender, non-distended, positive bowel sounds  Neuro: No new focal deficits.   Extremities: No edema      Diagnostic Data:    Labs:  Recent Labs   Lab 24  1307 24  0842   WBC 10.7 5.5   HGB 15.2 11.0*   MCV 86.8 88.4   .0 212.0       Recent Labs   Lab 24  1307 24  0842 24  0320   GLU 96 91 104*   BUN 15 8* 3*   CREATSERUM 0.74 0.65 0.65   CA 9.9 8.4* 8.5   ALB 4.8  --  3.3*   * 140 139   K 3.1* 3.7  3.7 2.8*   CL 95* 107 106   CO2 30.0 28.0 28.0   ALKPHO 84  --  58   AST 17  --  6*   ALT 23  --  15   BILT 1.6  --  0.6   TP 8.8*  --  6.1*       Estimated Creatinine Clearance: 114.2 mL/min (based on SCr of 0.65 mg/dL).    No results for input(s): \"TROP\", \"TROPHS\", \"CK\" in the last 168 hours.    No results for input(s): \"PTP\", \"INR\" in the last 168 hours.               Microbiology    No results found for this visit on 24.      Imaging: Reviewed in Epic.    Medications:    potassium chloride  40 mEq Intravenous Once    Followed by    potassium chloride  20 mEq Intravenous Once    heparin  5,000 Units Subcutaneous Q8H ADAL    pantoprazole  40 mg Intravenous Q12H    metoclopramide  5 mg  Intravenous Q8H       Assessment & Plan:      # Intractable N, V h/o hyperemesis   ADAL reglan  IVF  PPI  # Pneumomediastinum, ? Pneumothorax likely from intractable N,V  Supplemental O2  Supportive care  Pulm on cs: CT pending   Repeat CXR in AM  #HypoK, HypoNa- due to above       Christine Florence MD    Supplementary Documentation:     Quality:  DVT Mechanical Prophylaxis:   SCDs,    DVT Pharmacologic Prophylaxis   Medication    heparin (Porcine) 5000 UNIT/ML injection 5,000 Units                Code Status: Not on file  Meredith: No urinary catheter in place  Meredith Duration (in days):   Central line:    FLOYD: 2/6/2024    Discharge is dependent on: pulm recs   At this point Ms. Anca Fontanez is expected to be discharge to:  home    The 21st Century Cures Act makes medical notes like these available to patients in the interest of transparency. Please be advised this is a medical document. Medical documents are intended to carry relevant information, facts as evident, and the clinical opinion of the practitioner. The medical note is intended as peer to peer communication and may appear blunt or direct. It is written in medical language and may contain abbreviations or verbiage that are unfamiliar.

## 2024-02-06 NOTE — PROGRESS NOTES
[facility]     Hospitalist Progress Note     Zullylewis Fontanez Patient Status:  Inpatient    2005 MRN [id number]   [facility] 3NE-A Attending Christine Florence MD   Hosp Day # 2 PCP None Pcp     Chief Complaint: N, V     Subjective:     Patient doing well.      Objective:    Review of Systems:   A comprehensive review of systems was completed; pertinent positive and negatives stated in subjective.    Vital signs:  Temp:  [98 °F (36.7 °C)-99.5 °F (37.5 °C)] 98 °F (36.7 °C)  Pulse:  [65-97] 70  Resp:  [16-20] 16  BP: (120-155)/(63-92) 125/77  SpO2:  [99 %-100 %] 99 %    Physical Exam:    General: No acute distress  Respiratory: No wheezes, no rhonchi  Cardiovascular: S1, S2, regular rate and rhythm  Abdomen: Soft, Non-tender, non-distended, positive bowel sounds  Neuro: No new focal deficits.   Extremities: No edema      Diagnostic Data:    Labs:  Recent Labs   Lab 24  1307 24  0842   WBC 10.7 5.5   HGB 15.2 11.0*   MCV 86.8 88.4   .0 212.0       Recent Labs   Lab 24  1307 24  0842 24  0320   GLU 96 91 104*   BUN 15 8* 3*   CREATSERUM 0.74 0.65 0.65   CA 9.9 8.4* 8.5   ALB 4.8  --  3.3*   * 140 139   K 3.1* 3.7  3.7 2.8*   CL 95* 107 106   CO2 30.0 28.0 28.0   ALKPHO 84  --  58   AST 17  --  6*   ALT 23  --  15   BILT 1.6  --  0.6   TP 8.8*  --  6.1*       Estimated Creatinine Clearance: 114.2 mL/min (based on SCr of 0.65 mg/dL).    No results for input(s): \"TROP\", \"TROPHS\", \"CK\" in the last 168 hours.    No results for input(s): \"PTP\", \"INR\" in the last 168 hours.               Microbiology    No results found for this visit on 24.      Imaging: Reviewed in Epic.    Medications:    potassium chloride  20 mEq Intravenous Once    heparin  5,000 Units Subcutaneous Q8H ADAL    pantoprazole  40 mg Intravenous Q12H    metoclopramide  5 mg Intravenous Q8H       Assessment & Plan:      # Intractable N, V h/o hyperemesis   ADAL reglan  IVF  PPI  #  Pneumomediastinum, ? Pneumothorax likely from intractable N,V  Supplemental O2  Supportive care  Pulm on cs: CT pending   Repeat CXR in AM  #HypoK, HypoNa- due to above       Christine Florence MD    Supplementary Documentation:     Quality:  DVT Mechanical Prophylaxis:   SCDs,    DVT Pharmacologic Prophylaxis   Medication    heparin (Porcine) 5000 UNIT/ML injection 5,000 Units                Code Status: Not on file  Meredith: No urinary catheter in place  Meredith Duration (in days):   Central line:    FLOYD: 2/6/2024    Discharge is dependent on: pulm recs   At this point Ms. Anca Fontanez is expected to be discharge to:  home    The 21st Century Cures Act makes medical notes like these available to patients in the interest of transparency. Please be advised this is a medical document. Medical documents are intended to carry relevant information, facts as evident, and the clinical opinion of the practitioner. The medical note is intended as peer to peer communication and may appear blunt or direct. It is written in medical language and may contain abbreviations or verbiage that are unfamiliar.

## 2024-02-07 ENCOUNTER — APPOINTMENT (OUTPATIENT)
Dept: GENERAL RADIOLOGY | Facility: HOSPITAL | Age: 19
End: 2024-02-07
Attending: INTERNAL MEDICINE
Payer: MEDICAID

## 2024-02-07 VITALS
HEART RATE: 63 BPM | HEIGHT: 63 IN | WEIGHT: 147.94 LBS | TEMPERATURE: 98 F | SYSTOLIC BLOOD PRESSURE: 140 MMHG | DIASTOLIC BLOOD PRESSURE: 79 MMHG | OXYGEN SATURATION: 98 % | BODY MASS INDEX: 26.21 KG/M2 | RESPIRATION RATE: 18 BRPM

## 2024-02-07 LAB
MAGNESIUM SERPL-MCNC: 1.8 MG/DL (ref 1.6–2.6)
POTASSIUM SERPL-SCNC: 3.6 MMOL/L (ref 3.5–5.1)

## 2024-02-07 PROCEDURE — 99232 SBSQ HOSP IP/OBS MODERATE 35: CPT | Performed by: INTERNAL MEDICINE

## 2024-02-07 PROCEDURE — 71045 X-RAY EXAM CHEST 1 VIEW: CPT | Performed by: INTERNAL MEDICINE

## 2024-02-07 PROCEDURE — 99239 HOSP IP/OBS DSCHRG MGMT >30: CPT | Performed by: INTERNAL MEDICINE

## 2024-02-07 NOTE — DISCHARGE PLANNING
NURSING DISCHARGE NOTE    Discharged Home via Ambulatory.  Accompanied by Family member  Belongings Taken by patient/family.  IV and tele removed.

## 2024-02-07 NOTE — PLAN OF CARE
Assumed pt care at 1930  Pt is A&Ox4, following commands.   Pt on room air, VSS.  NSR  Pt denies pain.  Pt up ad casimiro.  Pt on regular diet. Tolerating diet.   L forearm NS at 100 mL/hr.  Potassium replaced  Non-cardiac electrolyte protocol  Bed in lowest position, call light in reach.     Problem: Patient/Family Goals  Goal: Patient/Family Long Term Goal  Description: Patient's Long Term Goal: to discharge    Interventions:  -   - See additional Care Plan goals for specific interventions  Outcome: Progressing  Goal: Patient/Family Short Term Goal  Description: Patient's Short Term Goal: for the nausea to subside    Interventions:   - scheduled reglan q8h  - See additional Care Plan goals for specific interventions  Outcome: Progressing     Problem: RESPIRATORY - ADULT  Goal: Achieves optimal ventilation and oxygenation  Description: INTERVENTIONS:  - Assess for changes in respiratory status  - Assess for changes in mentation and behavior  - Position to facilitate oxygenation and minimize respiratory effort  - Oxygen supplementation based on oxygen saturation or ABGs  - Provide Smoking Cessation handout, if applicable  - Encourage broncho-pulmonary hygiene including cough, deep breathe, Incentive Spirometry  - Assess the need for suctioning and perform as needed  - Assess and instruct to report SOB or any respiratory difficulty  - Respiratory Therapy support as indicated  - Manage/alleviate anxiety  - Monitor for signs/symptoms of CO2 retention  Outcome: Progressing     Problem: GASTROINTESTINAL - ADULT  Goal: Minimal or absence of nausea and vomiting  Description: INTERVENTIONS:  - Maintain adequate hydration with IV or PO as ordered and tolerated  - Nasogastric tube to low intermittent suction as ordered  - Evaluate effectiveness of ordered antiemetic medications  - Provide nonpharmacologic comfort measures as appropriate  - Advance diet as tolerated, if ordered  - Obtain nutritional consult as needed  - Evaluate  fluid balance  Outcome: Progressing     Problem: METABOLIC/FLUID AND ELECTROLYTES - ADULT  Goal: Electrolytes maintained within normal limits  Description: INTERVENTIONS:  - Monitor labs and rhythm and assess patient for signs and symptoms of electrolyte imbalances  - Administer electrolyte replacement as ordered  - Monitor response to electrolyte replacements, including rhythm and repeat lab results as appropriate  - Fluid restriction as ordered  - Instruct patient on fluid and nutrition restrictions as appropriate  Outcome: Progressing

## 2024-02-07 NOTE — DISCHARGE SUMMARY
[facility]  DISCHARGE SUMMARY     Zully Fontanez Patient Status:  Inpatient    2005 MRN [id number]   Location [facility] 3NE-A Attending No att. providers found   Hosp Day # 3 PCP None Pcp     Date of Admission: 2024  Date of Discharge:  2024     Discharge Disposition: Home or Self Care    Discharge Diagnosis:  Cannabis induced hyperemesis   Pneumomediastinum   Hypokalemia   Hyponatremia     History of Present Illness: Zully Fontanez is a 18 year old female with  h/o cannabis hyperemesis presenting with intractable N, V for one week. No fever. Pt has had chills.       Brief Synopsis: patient admitted and conservatively managed. Her CXR has remained stable and overall doing better. She is strongly advised to stop using marijuana.     Lace+ Score: 17  59-90 High Risk  29-58 Medium Risk  0-28   Low Risk  Patient was referred to the [facility].    TCM Follow-Up Recommendation:  LACE 29-58: Moderate Risk of readmission after discharge from the hospital.      Consultants:  Pulmonary     Discharge Medication List:     Discharge Medications        START taking these medications        Instructions Prescription details   metoclopramide 5 MG Tabs  Commonly known as: Reglan      Take 1 tablet (5 mg total) by mouth every 8 (eight) hours as needed.   Quantity: 30 tablet  Refills: 0            CHANGE how you take these medications        Instructions Prescription details   ondansetron 8 MG Tbdp  Commonly known as: Zofran-ODT  What changed: Another medication with the same name was added. Make sure you understand how and when to take each.      Take 1 tablet (8 mg total) by mouth every 6 (six) hours as needed for Nausea.   Quantity: 10 tablet  Refills: 0     ondansetron 4 MG Tbdp  Commonly known as: Zofran-ODT  What changed: Another medication with the same name was added. Make sure you understand how and when to take each.      Take 1 tablet (4 mg total) by mouth every 8  (eight) hours as needed.   Quantity: 15 tablet  Refills: 0     ondansetron 4 MG Tbdp  Commonly known as: Zofran-ODT  What changed: You were already taking a medication with the same name, and this prescription was added. Make sure you understand how and when to take each.      Take 2 tablets (8 mg total) by mouth every 4 (four) hours as needed for Nausea.   Quantity: 30 tablet  Refills: 0            CONTINUE taking these medications        Instructions Prescription details   Magnesium Glycinate 100 MG Caps      Take 1 capsule by mouth daily.   Refills: 0               Where to Get Your Medications        These medications were sent to [id number] - [place], [place] - [address] AT [address], [phone], [phone] [address], [place] [address]      Hours: 24-hours Phone: [phone]   metoclopramide 5 MG Tabs  ondansetron 4 MG Tbdp         [place]    Follow-up appointment:   Pcp, None  [facility] [id number]    Schedule an appointment as soon as possible for a visit in 1 week(s)  To establish with a primary physician with [facility], call our Physician Referral line at [phone], Monday through Friday from 7 a.m. to 6 p.m. and  from 7 a.m. to 1 p.m    Appointments for Next 30 Days 2024 - 3/10/2024      None            Vital signs:       Physical Exam:    General: No acute distress   Lungs: clear to auscultation  Cardiovascular: S1, S2  Abdomen: Soft NTND     -----------------------------------------------------------------------------------------------  PATIENT DISCHARGE INSTRUCTIONS: See electronic chart    Danielle Fuller MD    Total time spent on discharge plannin minutes     The  [employer] Cures Act makes medical notes like these available to patients in the interest of transparency. Please be advised this is a medical document. Medical documents are intended to carry relevant information, facts as evident, and the clinical  opinion of the practitioner. The medical note is intended as peer to peer communication and may appear blunt or direct. It is written in medical language and may contain abbreviations or verbiage that are unfamiliar.

## 2024-02-07 NOTE — PLAN OF CARE
Assumed patient care at 730. A/Ox4. Room air. Normal sinus on tele. Tolerating diet, taking it easy. No episodes of n/v this shift. Ambulates independently. Plan is for discharge tomorrow. All safety precaitons are in place and will continue with plan of care     Problem: RESPIRATORY - ADULT  Goal: Achieves optimal ventilation and oxygenation  Description: INTERVENTIONS:  - Assess for changes in respiratory status  - Assess for changes in mentation and behavior  - Position to facilitate oxygenation and minimize respiratory effort  - Oxygen supplementation based on oxygen saturation or ABGs  - Provide Smoking Cessation handout, if applicable  - Encourage broncho-pulmonary hygiene including cough, deep breathe, Incentive Spirometry  - Assess the need for suctioning and perform as needed  - Assess and instruct to report SOB or any respiratory difficulty  - Respiratory Therapy support as indicated  - Manage/alleviate anxiety  - Monitor for signs/symptoms of CO2 retention  Outcome: Progressing

## 2024-02-07 NOTE — PLAN OF CARE
Assumed patient care at 730. A/Ox4. Room air. Normal sinus on tele. Tolerating diet, taking it easy. No episodes of n/v this shift. Ambulates independently. Plan is for discharge tomorrow. Chest Xray completed. All safety precaitons are in place and will continue with plan of care     Problem: RESPIRATORY - ADULT  Goal: Achieves optimal ventilation and oxygenation  Description: INTERVENTIONS:  - Assess for changes in respiratory status  - Assess for changes in mentation and behavior  - Position to facilitate oxygenation and minimize respiratory effort  - Oxygen supplementation based on oxygen saturation or ABGs  - Provide Smoking Cessation handout, if applicable  - Encourage broncho-pulmonary hygiene including cough, deep breathe, Incentive Spirometry  - Assess the need for suctioning and perform as needed  - Assess and instruct to report SOB or any respiratory difficulty  - Respiratory Therapy support as indicated  - Manage/alleviate anxiety  - Monitor for signs/symptoms of CO2 retention  Outcome: Progressing     Problem: GASTROINTESTINAL - ADULT  Goal: Minimal or absence of nausea and vomiting  Description: INTERVENTIONS:  - Maintain adequate hydration with IV or PO as ordered and tolerated  - Nasogastric tube to low intermittent suction as ordered  - Evaluate effectiveness of ordered antiemetic medications  - Provide nonpharmacologic comfort measures as appropriate  - Advance diet as tolerated, if ordered  - Obtain nutritional consult as needed  - Evaluate fluid balance  Outcome: Progressing

## 2024-02-07 NOTE — PROGRESS NOTES
[facility] Pulmonary Progress Note    Zully Fontanez Patient Status:  Inpatient    2005 MRN [id number]   Location [facility] 3NE-A Attending Christine Florence MD   Hosp Day # 3 PCP None Pcp     Subjective:  Zully Fontanez is a(n) 18 year old female who is admitted for intractable n/v.    Overnight: continues to feel better today    Objective:  /79 (BP Location: Right arm)   Pulse 63   Temp 98.4 °F (36.9 °C) (Oral)   Resp 18   Ht 5' 3\" (1.6 m)   Wt 147 lb 14.9 oz (67.1 kg)   LMP 2023 (Approximate)   SpO2 98%   BMI 26.20 kg/m²       Temp (24hrs), Av.3 °F (36.8 °C), Min:98 °F (36.7 °C), Max:98.8 °F (37.1 °C)      Intake/Output:  No intake or output data in the 24 hours ending 24 1201      Physical Exam:   General: alert, cooperative, oriented.  No respiratory distress.   Head: Normocephalic, without obvious abnormality, atraumatic.   Throat: Lips, mucosa, and tongue normal.  No thrush noted.   Neck: trachea midline, no adenopathy, no thyromegaly. No JVD.   Lungs: clear to auscultation bilaterally   Chest wall: No tenderness or deformity.   Heart: regular rate and rhythm, S1, S2 normal, no murmur, click, rub or gallop   Abdomen: soft, non-distended, no masses, no guarding, no     Rebound.   Extremity: No edema or cyanosis   Skin: No rashes or lesions.   Neurological: Alert, interactive, no focal deficits    Lab Data Review:  Recent Labs     24  1307 24  0842   WBC 10.7 5.5   HGB 15.2 11.0*   .0 212.0     Recent Labs     24  1307 24  0842 24  0320   * 140 139   K 3.1* 3.7  3.7 2.8*   CL 95* 107 106   CO2 30.0 28.0 28.0   BUN 15 8* 3*   CREATSERUM 0.74 0.65 0.65     No results for input(s): \"PTP\", \"INR\", \"PTT\" in the last 168 hours.    Cultures:   No results found for this visit on 24.    Radiology:  XR CHEST AP PORTABLE  (CPT=71045)  Narrative: PROCEDURE:  XR CHEST AP PORTABLE  (CPT=71045)     TECHNIQUE:  AP chest radiograph was  obtained.     COMPARISON:  [facility] , CT, CT CHEST (CPT=71250), 2/06/2024, 7:49 AM.  [facility] , XR, XR CHEST AP PORTABLE  (CPT=71045), 2/05/2024, 4:44 AM.     INDICATIONS:  assess pneumomediastinum     PATIENT STATED HISTORY: (As transcribed by Technologist)  Patient offered no additional history at this time.           FINDINGS:  Decreased lucencies over the mediastinum.  Resolution of the component around the heart.  Resolution of extension into neck base.       Lung volumes are satisfactory.  No new consolidation.  CP angles remain sharp.  No pneumothorax.  Normal cardiomediastinal contour.                      Impression: CONCLUSION:  Improving pneumomediastinum.        LOCATION:  [facility]                 Dictated by (CST): Les Issa MD on 2/07/2024 at 10:24 AM       Finalized by (CST): Les Issa MD on 2/07/2024 at 10:26 AM         Medications reviewed     Assessment and Plan:   Patient Active Problem List   Diagnosis    Intractable nausea and vomiting    Cyclic vomiting syndrome    Hypokalemia    Cannabis hyperemesis syndrome concurrent with and due to cannabis abuse (HCC)    Marijuana use    Episode of recurrent major depressive disorder (HCC)    Pneumomediastinum (HCC)    Primary spontaneous pneumothorax    Cannabinoid hyperemesis syndrome     Assessment:  Pneumomediastinum, CXR reveiwed I do not see evidence of pneumothorax, likely associated with intractable vomiting, CT with moderate pneumomediastinum without pneumothorax  Intractable nausea/vomiting in the setting of above, improved        Plan:  Overall much improved  Management of pneumomediastinum, avoiding positive pressure or further inhalation  Advised to avoid heavy lifting for at least 1 month  Strongly encouraged not to smoke/use marijuana anymore  Nausea management per primary  Ok for dc from my perspective       Letty Marie MD  [facility]  Office: [phone]

## 2024-02-07 NOTE — DISCHARGE INSTRUCTIONS
Management of pneumomediastinum, avoiding positive pressure or further inhalation  Advised to avoid heavy lifting for at least 1 month  Strongly encouraged not to smoke/use marijuana anymore

## 2024-02-08 ENCOUNTER — PATIENT OUTREACH (OUTPATIENT)
Dept: CASE MANAGEMENT | Age: 19
End: 2024-02-08

## 2024-02-08 NOTE — PROGRESS NOTES
Initial Post Discharge Follow Up   Discharge Date: 2/7/24  Contact Date: 2/8/2024    Consent Verification:  Assessment Completed With: Patient  HIPAA Verified?  Yes    Discharge Dx:   Pneumomediastinum, CXR reveiwed I do not see evidence of pneumothorax, likely associated with intractable vomiting.    General:   How have you been since your discharge from the hospital? Pt reports she is feeling better. Pt denies n/v since discharge. Pt is able to eat and drinking okay. Pt denies issues with urinating, having BM and or completing ADL's. Pt is aware she is to avoid heavy lifting for atleast one month and she is encouraged not to smoke or use marijuana anymore. Pt stated she has an outside PCP she plans to FU with. Pt stated her grandmother is helping her schedule with her PCP. Pt was not able to recall who her PCP is at this time. Pt declined TCC when offered. Pt denies chest pain, SOB, dizziness, weakness, fevers, confusion and pain.   Do you have any pain since discharge?  No    How well was your pain managed while in the hospital?   On a scale of 1-5   1- Very Poor and 5- Very well   Very Well  When you were leaving the hospital were your discharge instructions reviewed with you? Yes  How well were your discharge instructions explained to you?   On a scale of 1-5   1- Very Poor and 5- Very well   Very Well  Do you have any questions about your discharge instructions?  No  Before leaving the hospital was your diagnoses explained to you? Yes  Do you have any questions about your diagnoses? No  Are you able to perform normal daily activities of living as you have prior to your hospital stay (dressing, bathing, ambulating to the bathroom, etc)? no  If No, What are some barriers or concerns?  Pt is not to lift anything heavy for one month.   (NCM) Was patient given a different diet per AVS? no    Medications:  Current Outpatient Medications   Medication Sig Dispense Refill    metoclopramide 5 MG Oral Tab Take 1 tablet  (5 mg total) by mouth every 8 (eight) hours as needed. 30 tablet 0    ondansetron 4 MG Oral Tablet Dispersible Take 2 tablets (8 mg total) by mouth every 4 (four) hours as needed for Nausea. 30 tablet 0    Magnesium Glycinate 100 MG Oral Cap Take 1 capsule by mouth daily.      ondansetron 4 MG Oral Tablet Dispersible Take 1 tablet (4 mg total) by mouth every 8 (eight) hours as needed. 15 tablet 0    ondansetron 8 MG Oral Tablet Dispersible Take 1 tablet (8 mg total) by mouth every 6 (six) hours as needed for Nausea. 10 tablet 0     Were there any changes to your current medication(s) noted on the AVS? Yes  If so, were these medication changes discussed with you prior to leaving the hospital? Yes  If a new medication was prescribed:    Was the new medication's purpose & side effects reviewed? Yes  Do you have any questions about your new medication? No  Did you  your discharge medications when you left the hospital? Yes  Let's go over your medications together to make sure we are not missing anything. Patient Declined, explain: pt declined to review medications. Advised pt to review with PCP   Are there any reasons that keep you from taking your medication as prescribed? No  Are you having any concerns with constipation? No  Did patient receive their flu shot (Sept-March)? Unknown     Discharge medications reviewed/discussed/and reconciled against outpatient medications with patient.  Any changes or updates to medications sent to PCP.  Patient Declined     Referrals/orders at D/C:  Referrals/orders placed at D/C? no    DME ordered at D/C? No  Discharge orders, AVS reviewed and discussed with patient. Any changes or updates to orders sent to PCP.  Patient Acknowledged      SDOH:     Transportation Needs: No Transportation Needs (2/4/2024)    Transportation Needs     Lack of Transportation: No       Financial Resource Strain: Low Risk  (2/8/2024)    Financial Resource Strain     Difficulty of Paying Living  Expenses: Not very hard     Med Affordability: No         Follow up appointments:  Reviewed recommended follow up appointments. See below. Pt denies any barriers to keeping/getting to HFU appts.     Your appointments       Date & Time Appointment Department ([place])    Mar 11, 2024 12:30 PM CDT Exam - New Patient with Letty Marie MD [facility] ([facility]              [facility]  [address] , [facility] [address]  [facility] [address]  [phone]            TCC  Was TCC ordered: Yes  Was TCC scheduled: No, Explain: Pt declined   If yes: []  Advised patient to bring all medications and blood glucose meter/supplies if applicable.    PCP (If no TCC appointment)  Does patient already have a PCP appointment scheduled? No  NCM Attempted to schedule PCP office TCM appointment with patient   If no appointment scheduled: Explain: pt has an outside PCP she plans to FU with.     Specialist    Does the patient have any other follow up appointment(s) needing to be scheduled? Yes  If yes: NCM reviewed upcoming specialist appointment with patient: Yes  Does the patient need assistance scheduling appointment(s): No    Is there any reason as to why you cannot make your appointment(s)?  No     Needs post D/C:   Now that you are home, are there any needs or concerns you need addressed before your next visit with your PCP?  (DME, meds, questions, etc.): No    Interventions by NCM:   All d/c instructions reviewed with the pt. Reviewed when to call MD vs when to call 911 or go the ED. Discussed s/s of dehydration. Educated pt on the importance of taking all meds as prescribed as well as close f/u with PCP/specialists. Pt verbalized understanding and will contact the office with any further questions or concerns. Pt is aware her potassium was low in the hospital but normal now.    Overall  Rating:   How would you rate the care you received while in the hospital? good    CCM referral placed:    Not Applicable

## 2024-02-08 NOTE — PROGRESS NOTES
Attempted to contact pt for condition update however no answer. Call continued to ring and then disconnected. Unable to leave a VM at this time. NCM to try again at a later time.

## 2024-03-11 ENCOUNTER — HOSPITAL ENCOUNTER (OUTPATIENT)
Dept: GENERAL RADIOLOGY | Facility: HOSPITAL | Age: 19
Discharge: HOME | End: 2024-03-11
Attending: INTERNAL MEDICINE
Payer: MEDICAID

## 2024-03-11 ENCOUNTER — OFFICE VISIT (OUTPATIENT)
Facility: CLINIC | Age: 19
End: 2024-03-11
Payer: MEDICAID

## 2024-03-11 VITALS
DIASTOLIC BLOOD PRESSURE: 78 MMHG | RESPIRATION RATE: 16 BRPM | BODY MASS INDEX: 25.34 KG/M2 | SYSTOLIC BLOOD PRESSURE: 132 MMHG | HEIGHT: 63 IN | OXYGEN SATURATION: 99 % | WEIGHT: 143 LBS | HEART RATE: 100 BPM

## 2024-03-11 DIAGNOSIS — J98.2 PNEUMOMEDIASTINUM (HCC): ICD-10-CM

## 2024-03-11 DIAGNOSIS — J98.2 PNEUMOMEDIASTINUM (HCC): Primary | ICD-10-CM

## 2024-03-11 PROCEDURE — 71046 X-RAY EXAM CHEST 2 VIEWS: CPT | Performed by: INTERNAL MEDICINE

## 2024-03-11 PROCEDURE — 99214 OFFICE O/P EST MOD 30 MIN: CPT | Performed by: INTERNAL MEDICINE

## 2024-03-11 NOTE — PROGRESS NOTES
[facility] Follow Up Note    History of Present Illness:  Zully Fontanez is a 18 year old female who presents today for hospital follow up.  Patient was hospitalized for cannabis hyperemesis syndrome and pneumomediastinum.  Overall feels well.  Has not gone back to work as of yet.  Walking back to baseline.        Past Medical History:   Past Medical History:   Diagnosis Date    Cannabis hyperemesis syndrome concurrent with and due to cannabis abuse (HCC)     Cyclic vomiting syndrome     Development delay     Failure to thrive (0-17)     Hyperemesis         Past Surgical History:   History reviewed. No pertinent surgical history.    Family Medical History: No family history on file.     Social History:   Social History     Socioeconomic History    Marital status: Single     Spouse name: Not on file    Number of children: Not on file    Years of education: Not on file    Highest education level: Not on file   Occupational History    Not on file   Tobacco Use    Smoking status: Never     Passive exposure: Never    Smokeless tobacco: Current   Vaping Use    Vaping Use: Every day   Substance and Sexual Activity    Alcohol use: Not Currently    Drug use: Yes     Types: Cannabis    Sexual activity: Not on file   Other Topics Concern    Not on file   Social History Narrative    ** Merged History Encounter **          Social Determinants of Health     Financial Resource Strain: Low Risk  (2/8/2024)    Financial Resource Strain     Difficulty of Paying Living Expenses: Not very hard     Med Affordability: No   Food Insecurity: No Food Insecurity (2/4/2024)    Food Insecurity     Food Insecurity: Never true   Transportation Needs: No Transportation Needs (2/8/2024)    Transportation Needs     Lack of Transportation: No   Physical Activity: Not on file   Stress: Not on file   Social Connections: Not on file   Housing Stability: Low Risk  (2/4/2024)    Housing Stability     Housing Instability: No     Housing  Instability Emergency: Not on file        Medications:   Current Outpatient Medications   Medication Sig Dispense Refill    metoclopramide 5 MG Oral Tab Take 1 tablet (5 mg total) by mouth every 8 (eight) hours as needed. 30 tablet 0    ondansetron 4 MG Oral Tablet Dispersible Take 2 tablets (8 mg total) by mouth every 4 (four) hours as needed for Nausea. 30 tablet 0    Magnesium Glycinate 100 MG Oral Cap Take 1 capsule by mouth daily.      ondansetron 4 MG Oral Tablet Dispersible Take 1 tablet (4 mg total) by mouth every 8 (eight) hours as needed. 15 tablet 0    ondansetron 8 MG Oral Tablet Dispersible Take 1 tablet (8 mg total) by mouth every 6 (six) hours as needed for Nausea. 10 tablet 0       Review of Systems: Review of Systems     Physical Exam:  /78 (BP Location: Right arm, Patient Position: Sitting, Cuff Size: adult)   Pulse 100   Resp 16   Ht 5' 3\" (1.6 m)   Wt 143 lb (64.9 kg)   LMP 05/01/2023 (Approximate)   SpO2 99%   BMI 25.33 kg/m²      Constitutional: alert, cooperative. No acute distress.  HEENT: Head NC/AT.   Cardio: Regular rate and rhythm. Normal S1 and S2. No murmurs.   Respiratory: Thorax symmetrical with no labored breathing. Clear to ausculation bilaterally with symmetrical breath sounds. No wheezing, rhonchi, rales, or crackles.   GI: NABS. Abd soft, non-tender.  Extremities: No clubbing or cyanosis. No BLE edema.    Neurologic: A&Ox3. No gross motor deficits.  Skin: Warm, dry  Psych: Calm, cooperative. Pleasant affect.    Results:  Personally reviewed  XR CHEST AP PORTABLE  (CPT=71045)  Narrative: PROCEDURE:  XR CHEST AP PORTABLE  (CPT=71045)     TECHNIQUE:  AP chest radiograph was obtained.     COMPARISON:  [facility] , CT, CT CHEST (CPT=71250), 2/06/2024, 7:49 AM.  [facility] , XR, XR CHEST AP PORTABLE  (CPT=71045), 2/05/2024, 4:44 AM.     INDICATIONS:  assess pneumomediastinum     PATIENT STATED HISTORY: (As transcribed by Technologist)  Patient offered no additional history at this  time.           FINDINGS:  Decreased lucencies over the mediastinum.  Resolution of the component around the heart.  Resolution of extension into neck base.       Lung volumes are satisfactory.  No new consolidation.  CP angles remain sharp.  No pneumothorax.  Normal cardiomediastinal contour.                      Impression: CONCLUSION:  Improving pneumomediastinum.        LOCATION:  [facility]                 Dictated by (CST): Les Issa MD on 2/07/2024 at 10:24 AM       Finalized by (CST): Les Issa MD on 2/07/2024 at 10:26 AM         PFTs:       No data to display                   No data to display                    WBC: 5.5, done on 2/5/2024.  HGB: 11, done on 2/5/2024.  PLT: 212, done on 2/5/2024.     Glucose: 104, done on 2/6/2024.  Cr: 0.65, done on 2/6/2024.  GFR(AA): 150, done on 5/18/2022.  GFR (non-AA): 130, done on 5/18/2022.  CA: 8.5, done on 2/6/2024.  Na: 139, done on 2/6/2024.  K: 3.6, done on 2/7/2024.  Cl: 106, done on 2/6/2024.  CO2: 28, done on 2/6/2024.  Last ALB was 3.3% done on 2/6/2024.     XR CHEST AP PORTABLE  (CPT=71045)    Result Date: 2/7/2024  CONCLUSION:  Improving pneumomediastinum.   LOCATION:  [facility]      Dictated by (CST): Les Issa MD on 2/07/2024 at 10:24 AM     Finalized by (CST): Les Issa MD on 2/07/2024 at 10:26 AM       CT CHEST (FGN=59609)    Result Date: 2/6/2024  CONCLUSION:  Moderate pneumomediastinum is present and is grossly stable from prior exams accounting for differences in technique.  There is no evidence of pneumothorax.  There is a nodular area of the right lung apex which is partially calcified and most likely represents a granuloma.  Consider attention this region on follow-up exams.  LOCATION:  [id number]   Dictated by (CST): Ned Villela MD on 2/06/2024 at 8:11 AM     Finalized by (CST): Ned Villela MD on 2/06/2024 at 8:18 AM       XR CHEST AP PORTABLE  (CPT=71045)    Result Date: 2/5/2024  CONCLUSION:  Stable pneumomediastinum and pneumopericardium.   Stable small amount of air attenuation in the deep soft tissues of the lower neck.    LOCATION:  [id number]      Dictated by (CST): Eric Diaz MD on 2/05/2024 at 6:46 AM     Finalized by (CST): Eric Diaz MD on 2/05/2024 at 6:48 AM       XR CHEST AP PORTABLE  (CPT=71045)    Result Date: 2/4/2024  CONCLUSION:  See above.   LOCATION:  [facility]      Dictated by (CST): Ned Villela MD on 2/04/2024 at 3:33 PM     Finalized by (CST): Ned Villela MD on 2/04/2024 at 3:34 PM       CT ABDOMEN+PELVIS KIDNEYSTONE 2D RNDR(NO IV,NO ORAL)(CPT=74176)    Result Date: 2/4/2024  CONCLUSION:  PNEUMOMEDIASTINUM AND PNEUMOTHORAX partially seen at the lung base bilaterally as described above, the etiology of which is uncertain.  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  Urinary bladder shows no stone.    LOCATION:  [facility]   Dictated by (CST): Wally Painter MD on 2/04/2024 at 2:49 PM     Finalized by (CST): Wally Painter MD on 2/04/2024 at 2:54 PM         Assessment/Plan:  #1. Pneumomediastinum  Hospitalization notes labs and imaging reviewed  Would repeat XR today just for completion sake  I think patient is safe to go back to work   Encouraged to avoid further inhlation/THC use  Can follow up as needed    Return if symptoms worsen or fail to improve.      Letty Marie MD  3/11/2024

## 2024-03-13 ENCOUNTER — APPOINTMENT (OUTPATIENT)
Dept: CT IMAGING | Facility: HOSPITAL | Age: 19
End: 2024-03-13
Attending: PEDIATRICS
Payer: MEDICAID

## 2024-03-13 PROCEDURE — 74177 CT ABD & PELVIS W/CONTRAST: CPT | Performed by: PEDIATRICS

## 2024-04-12 ENCOUNTER — HOSPITAL ENCOUNTER (EMERGENCY)
Facility: HOSPITAL | Age: 19
Discharge: HOME | End: 2024-04-12
Attending: EMERGENCY MEDICINE
Payer: MEDICAID

## 2024-04-12 VITALS
HEIGHT: 63 IN | TEMPERATURE: 98 F | DIASTOLIC BLOOD PRESSURE: 54 MMHG | BODY MASS INDEX: 24.8 KG/M2 | OXYGEN SATURATION: 100 % | SYSTOLIC BLOOD PRESSURE: 108 MMHG | WEIGHT: 140 LBS | RESPIRATION RATE: 21 BRPM | HEART RATE: 81 BPM

## 2024-04-12 DIAGNOSIS — R11.2 NAUSEA AND VOMITING, UNSPECIFIED VOMITING TYPE: Primary | ICD-10-CM

## 2024-04-12 LAB
ALBUMIN SERPL-MCNC: 4.7 G/DL (ref 3.4–5)
ALBUMIN/GLOB SERPL: 1.2 {RATIO} (ref 1–2)
ALP LIVER SERPL-CCNC: 81 U/L
ALT SERPL-CCNC: 23 U/L
ANION GAP SERPL CALC-SCNC: 8 MMOL/L (ref 0–18)
AST SERPL-CCNC: 11 U/L (ref 15–37)
B-HCG UR QL: NEGATIVE
BASOPHILS # BLD AUTO: 0.03 X10(3) UL (ref 0–0.2)
BASOPHILS NFR BLD AUTO: 0.2 %
BILIRUB SERPL-MCNC: 0.9 MG/DL (ref 0.1–2)
BUN BLD-MCNC: 10 MG/DL (ref 9–23)
CALCIUM BLD-MCNC: 9.6 MG/DL (ref 8.5–10.1)
CHLORIDE SERPL-SCNC: 105 MMOL/L (ref 98–112)
CO2 SERPL-SCNC: 26 MMOL/L (ref 21–32)
CREAT BLD-MCNC: 0.91 MG/DL
EGFRCR SERPLBLD CKD-EPI 2021: 93 ML/MIN/1.73M2 (ref 60–?)
EOSINOPHIL # BLD AUTO: 0 X10(3) UL (ref 0–0.7)
EOSINOPHIL NFR BLD AUTO: 0 %
ERYTHROCYTE [DISTWIDTH] IN BLOOD BY AUTOMATED COUNT: 12.5 %
GLOBULIN PLAS-MCNC: 3.8 G/DL (ref 2.8–4.4)
GLUCOSE BLD-MCNC: 163 MG/DL (ref 70–99)
HCT VFR BLD AUTO: 38.6 %
HGB BLD-MCNC: 13.1 G/DL
IMM GRANULOCYTES # BLD AUTO: 0.08 X10(3) UL (ref 0–1)
IMM GRANULOCYTES NFR BLD: 0.5 %
LYMPHOCYTES # BLD AUTO: 0.51 X10(3) UL (ref 1–4)
LYMPHOCYTES NFR BLD AUTO: 3.2 %
MCH RBC QN AUTO: 29.4 PG (ref 26–34)
MCHC RBC AUTO-ENTMCNC: 33.9 G/DL (ref 31–37)
MCV RBC AUTO: 86.7 FL
MONOCYTES # BLD AUTO: 0.43 X10(3) UL (ref 0.1–1)
MONOCYTES NFR BLD AUTO: 2.7 %
NEUTROPHILS # BLD AUTO: 14.8 X10 (3) UL (ref 1.5–7.7)
NEUTROPHILS # BLD AUTO: 14.8 X10(3) UL (ref 1.5–7.7)
NEUTROPHILS NFR BLD AUTO: 93.4 %
OSMOLALITY SERPL CALC.SUM OF ELEC: 291 MOSM/KG (ref 275–295)
PLATELET # BLD AUTO: 320 10(3)UL (ref 150–450)
POTASSIUM SERPL-SCNC: 3.9 MMOL/L (ref 3.5–5.1)
PROT SERPL-MCNC: 8.5 G/DL (ref 6.4–8.2)
RBC # BLD AUTO: 4.45 X10(6)UL
SODIUM SERPL-SCNC: 139 MMOL/L (ref 136–145)
WBC # BLD AUTO: 15.9 X10(3) UL (ref 4–11)

## 2024-04-12 PROCEDURE — 99284 EMERGENCY DEPT VISIT MOD MDM: CPT

## 2024-04-12 PROCEDURE — 96374 THER/PROPH/DIAG INJ IV PUSH: CPT

## 2024-04-12 PROCEDURE — 81025 URINE PREGNANCY TEST: CPT

## 2024-04-12 PROCEDURE — 80053 COMPREHEN METABOLIC PANEL: CPT

## 2024-04-12 PROCEDURE — 80053 COMPREHEN METABOLIC PANEL: CPT | Performed by: EMERGENCY MEDICINE

## 2024-04-12 PROCEDURE — 96361 HYDRATE IV INFUSION ADD-ON: CPT

## 2024-04-12 PROCEDURE — 96375 TX/PRO/DX INJ NEW DRUG ADDON: CPT

## 2024-04-12 PROCEDURE — 85025 COMPLETE CBC W/AUTO DIFF WBC: CPT | Performed by: EMERGENCY MEDICINE

## 2024-04-12 PROCEDURE — S0028 INJECTION, FAMOTIDINE, 20 MG: HCPCS | Performed by: EMERGENCY MEDICINE

## 2024-04-12 PROCEDURE — 85025 COMPLETE CBC W/AUTO DIFF WBC: CPT

## 2024-04-12 RX ORDER — ONDANSETRON 4 MG/1
4 TABLET, ORALLY DISINTEGRATING ORAL EVERY 4 HOURS PRN
Qty: 10 TABLET | Refills: 0 | Status: SHIPPED | OUTPATIENT
Start: 2024-04-12 | End: 2024-04-19

## 2024-04-12 RX ORDER — ONDANSETRON 2 MG/ML
4 INJECTION INTRAMUSCULAR; INTRAVENOUS ONCE
Status: COMPLETED | OUTPATIENT
Start: 2024-04-12 | End: 2024-04-12

## 2024-04-12 RX ORDER — FAMOTIDINE 10 MG/ML
20 INJECTION, SOLUTION INTRAVENOUS ONCE
Status: COMPLETED | OUTPATIENT
Start: 2024-04-12 | End: 2024-04-12

## 2024-04-12 NOTE — DISCHARGE INSTRUCTIONS
Advance diet slowly  Zofran for nausea  Take Pepcid over-the-counter 20 mg daily for 1 week  Continue cessation of marijuana  Return if worse  Recheck with primary care 1 week, sooner if not improving

## 2024-04-12 NOTE — ED INITIAL ASSESSMENT (HPI)
Patient here with c/o N/V x 1 week.  Patient reports she can't keep anything down.  She reports she did stop using marijuana but it hasn't gotten better.

## 2024-04-12 NOTE — ED PROVIDER NOTES
Patient Seen in: [facility] Emergency Department      History     Chief Complaint   Patient presents with    Nausea/vomiting     Stated Complaint: pt states vomiting for the last week no getting better    Subjective:   HPI    This is a 18-year-old female past medical history of cannabis hyperemesis syndrome who presents with nausea and vomiting which patient states began last night she reports she cannot keep anything down.  Patient reports that a couple episodes of vomiting during the night.  She states she stopped smoking marijuana 2 weeks ago.  She does not use any other illicit drugs.  No fevers chills cough or cold symptoms.  No abdominal pain.  No urinary symptoms.  She does vape nicotine.  Prior abdominal surgeries.  She presents from home with friend at bedside.    Objective:   Past Medical History:    Cannabis hyperemesis syndrome concurrent with and due to cannabis abuse (HCC)    Cyclic vomiting syndrome    Development delay    Failure to thrive (0-17)    Hyperemesis              History reviewed. No pertinent surgical history.             Social History     Socioeconomic History    Marital status: Single   Tobacco Use    Smoking status: Never     Passive exposure: Never    Smokeless tobacco: Current   Vaping Use    Vaping status: Every Day   Substance and Sexual Activity    Alcohol use: Not Currently    Drug use: Yes     Types: Cannabis   Social History Narrative    ** Merged History Encounter **          Social Determinants of Health     Financial Resource Strain: Low Risk  (2/8/2024)    Financial Resource Strain     Difficulty of Paying Living Expenses: Not very hard     Med Affordability: No   Food Insecurity: No Food Insecurity (2/4/2024)    Food Insecurity     Food Insecurity: Never true   Transportation Needs: No Transportation Needs (2/8/2024)    Transportation Needs     Lack of Transportation: No   Housing Stability: Low Risk  (2/4/2024)    Housing Stability     Housing Instability: No               Review of Systems    Positive for stated complaint: pt states vomiting for the last week no getting better  Other systems are as noted in HPI.  Constitutional and vital signs reviewed.      All other systems reviewed and negative except as noted above.    Physical Exam     ED Triage Vitals [04/12/24 0526]   BP (!) 155/96   Pulse (!) 122   Resp 18   Temp 98 °F (36.7 °C)   Temp src Temporal   SpO2 100 %   O2 Device None (Room air)       Current:BP 97/50   Pulse 72   Temp 98 °F (36.7 °C) (Temporal)   Resp 22   Ht 160 cm (5' 3\")   Wt 63.5 kg   LMP 03/31/2024 (Exact Date)   SpO2 100%   BMI 24.80 kg/m²         Physical Exam    GENERAL: Awake, alert oriented x3, nontoxic appearing.   SKIN: Normal, warm, and dry.  HEENT:  Pupils equally round and reactive to light. Conjuctiva clear.  Oropharynx is clear and moist.   Lungs: Clear to auscultation bilaterally with no rales, no retractions, and no wheezing.  HEART:  Regular rate and rhythm. S1 and S2. No murmurs, no rubs or gallops.   ABDOMEN: Soft, nontender and nondistended. Normoactive bowel sounds. No rebound. No guarding.   EXTREMITIES: Warm with brisk capillary refill.         ED Course     Labs Reviewed   COMP METABOLIC PANEL (14) - Abnormal; Notable for the following components:       Result Value    Glucose 163 (*)     AST 11 (*)     Total Protein 8.5 (*)     All other components within normal limits   CBC W/ DIFFERENTIAL - Abnormal; Notable for the following components:    WBC 15.9 (*)     Neutrophil Absolute Prelim 14.80 (*)     Neutrophil Absolute 14.80 (*)     Lymphocyte Absolute 0.51 (*)     All other components within normal limits   POCT PREGNANCY URINE - Normal   CBC WITH DIFFERENTIAL WITH PLATELET    Narrative:     The following orders were created for panel order CBC With Differential With Platelet.  Procedure                               Abnormality         Status                     ---------                               -----------          ------                     CBC W/ DIFFERENTIAL[[id number]]          Abnormal            Final result                 Please view results for these tests on the individual orders.                      MDM            This is a 18-year-old female past medical history of cannabis hyperemesis syndrome who presents with nausea and vomiting which patient states began last night she reports she cannot keep anything down.  Differential includes gastritis, viral gastritis, hyperemesis.        IV line was established of normal saline.  Patient was given IV Zofran and Pepcid.  Patient was given a 1 L bolus.  Basic labs were obtained.  CBC: White blood cell count 15.9.  Hemoglobin 13.1.  Platelet 320.  CMP: BUN 10.  Creatinine 0.9.  Glucose 163.  Bicarb 26.    Urine pregnancy test negative.      Patient had no further vomiting in the ED.  She was able to tolerate fluids.  Will discharge home to advance diet slowly.  Zofran for nausea.  Return if worse.  Continue marijuana cessation.    Patient discharged home in good condition with her friend              Disposition and Plan     Clinical Impression:  1. Nausea and vomiting, unspecified vomiting type         Disposition:  Discharge  4/12/2024  8:06 am    Follow-up:  Tavon Lizama MD  [address] [address]  [phone]    Follow up in 1 week(s)            Medications Prescribed:  Current Discharge Medication List        START taking these medications    Details   !! ondansetron 4 MG Oral Tablet Dispersible Take 1 tablet (4 mg total) by mouth every 4 (four) hours as needed for Nausea.  Qty: 10 tablet, Refills: 0       !! - Potential duplicate medications found. Please discuss with provider.

## 2024-09-28 ENCOUNTER — HOSPITAL ENCOUNTER (EMERGENCY)
Facility: HOSPITAL | Age: 19
Discharge: HOME | End: 2024-09-29
Attending: EMERGENCY MEDICINE
Payer: MEDICAID

## 2024-09-28 DIAGNOSIS — R11.2 NAUSEA AND VOMITING, UNSPECIFIED VOMITING TYPE: Primary | ICD-10-CM

## 2024-09-28 DIAGNOSIS — E87.6 HYPOKALEMIA: ICD-10-CM

## 2024-09-28 LAB
ALBUMIN SERPL-MCNC: 5.3 G/DL (ref 3.2–4.8)
ALBUMIN/GLOB SERPL: 1.7 {RATIO} (ref 1–2)
ALP LIVER SERPL-CCNC: 78 U/L
ALT SERPL-CCNC: 14 U/L
ANION GAP SERPL CALC-SCNC: 13 MMOL/L (ref 0–18)
AST SERPL-CCNC: 22 U/L (ref ?–34)
B-HCG SERPL-ACNC: <2.6 MIU/ML
BASOPHILS # BLD AUTO: 0.03 X10(3) UL (ref 0–0.2)
BASOPHILS NFR BLD AUTO: 0.2 %
BILIRUB SERPL-MCNC: 0.8 MG/DL (ref 0.3–1.2)
BUN BLD-MCNC: 8 MG/DL (ref 9–23)
CALCIUM BLD-MCNC: 10.7 MG/DL (ref 8.7–10.4)
CHLORIDE SERPL-SCNC: 102 MMOL/L (ref 98–112)
CO2 SERPL-SCNC: 24 MMOL/L (ref 21–32)
CREAT BLD-MCNC: 0.77 MG/DL
EGFRCR SERPLBLD CKD-EPI 2021: 113 ML/MIN/1.73M2 (ref 60–?)
EOSINOPHIL # BLD AUTO: 0 X10(3) UL (ref 0–0.7)
EOSINOPHIL NFR BLD AUTO: 0 %
ERYTHROCYTE [DISTWIDTH] IN BLOOD BY AUTOMATED COUNT: 12.4 %
GLOBULIN PLAS-MCNC: 3.2 G/DL (ref 2–3.5)
GLUCOSE BLD-MCNC: 122 MG/DL (ref 70–99)
HCT VFR BLD AUTO: 38.8 %
HGB BLD-MCNC: 13.4 G/DL
IMM GRANULOCYTES # BLD AUTO: 0.05 X10(3) UL (ref 0–1)
IMM GRANULOCYTES NFR BLD: 0.3 %
LYMPHOCYTES # BLD AUTO: 0.68 X10(3) UL (ref 1–4)
LYMPHOCYTES NFR BLD AUTO: 4.7 %
MCH RBC QN AUTO: 29.6 PG (ref 26–34)
MCHC RBC AUTO-ENTMCNC: 34.5 G/DL (ref 31–37)
MCV RBC AUTO: 85.8 FL
MONOCYTES # BLD AUTO: 0.37 X10(3) UL (ref 0.1–1)
MONOCYTES NFR BLD AUTO: 2.6 %
NEUTROPHILS # BLD AUTO: 13.32 X10 (3) UL (ref 1.5–7.7)
NEUTROPHILS # BLD AUTO: 13.32 X10(3) UL (ref 1.5–7.7)
NEUTROPHILS NFR BLD AUTO: 92.2 %
OSMOLALITY SERPL CALC.SUM OF ELEC: 288 MOSM/KG (ref 275–295)
PLATELET # BLD AUTO: 304 10(3)UL (ref 150–450)
POTASSIUM SERPL-SCNC: 3.3 MMOL/L (ref 3.5–5.1)
PROT SERPL-MCNC: 8.5 G/DL (ref 5.7–8.2)
RBC # BLD AUTO: 4.52 X10(6)UL
SODIUM SERPL-SCNC: 139 MMOL/L (ref 136–145)
WBC # BLD AUTO: 14.5 X10(3) UL (ref 4–11)

## 2024-09-28 PROCEDURE — 80053 COMPREHEN METABOLIC PANEL: CPT | Performed by: EMERGENCY MEDICINE

## 2024-09-28 PROCEDURE — 99284 EMERGENCY DEPT VISIT MOD MDM: CPT

## 2024-09-28 PROCEDURE — 85025 COMPLETE CBC W/AUTO DIFF WBC: CPT | Performed by: EMERGENCY MEDICINE

## 2024-09-28 PROCEDURE — 84702 CHORIONIC GONADOTROPIN TEST: CPT | Performed by: EMERGENCY MEDICINE

## 2024-09-28 PROCEDURE — 96375 TX/PRO/DX INJ NEW DRUG ADDON: CPT

## 2024-09-28 PROCEDURE — 96361 HYDRATE IV INFUSION ADD-ON: CPT

## 2024-09-28 PROCEDURE — 96374 THER/PROPH/DIAG INJ IV PUSH: CPT

## 2024-09-28 RX ORDER — ONDANSETRON 2 MG/ML
4 INJECTION INTRAMUSCULAR; INTRAVENOUS ONCE
Status: COMPLETED | OUTPATIENT
Start: 2024-09-28 | End: 2024-09-28

## 2024-09-28 RX ORDER — METOCLOPRAMIDE HYDROCHLORIDE 5 MG/ML
10 INJECTION INTRAMUSCULAR; INTRAVENOUS ONCE
Status: COMPLETED | OUTPATIENT
Start: 2024-09-28 | End: 2024-09-29

## 2024-09-28 RX ORDER — DIPHENHYDRAMINE HYDROCHLORIDE 50 MG/ML
25 INJECTION INTRAMUSCULAR; INTRAVENOUS ONCE
Status: COMPLETED | OUTPATIENT
Start: 2024-09-28 | End: 2024-09-29

## 2024-09-29 VITALS
TEMPERATURE: 97 F | RESPIRATION RATE: 23 BRPM | OXYGEN SATURATION: 100 % | BODY MASS INDEX: 25 KG/M2 | DIASTOLIC BLOOD PRESSURE: 78 MMHG | SYSTOLIC BLOOD PRESSURE: 138 MMHG | WEIGHT: 140 LBS | HEART RATE: 79 BPM

## 2024-09-29 RX ORDER — ONDANSETRON 4 MG/1
4 TABLET, ORALLY DISINTEGRATING ORAL EVERY 4 HOURS PRN
Qty: 10 TABLET | Refills: 0 | Status: SHIPPED | OUTPATIENT
Start: 2024-09-29 | End: 2024-10-06

## 2024-09-29 RX ORDER — POTASSIUM CHLORIDE 1500 MG/1
20 TABLET, EXTENDED RELEASE ORAL ONCE
Status: COMPLETED | OUTPATIENT
Start: 2024-09-29 | End: 2024-09-29

## 2024-09-29 NOTE — ED PROVIDER NOTES
Patient Seen in: [facility] Emergency Department      History     Chief Complaint   Patient presents with    Nausea/Vomiting/Diarrhea     X 2 weeks     Stated Complaint: N/V    Subjective:   HPI    This is a 19-year-old woman history of cyclic vomiting, here for evaluation of multiple episodes nonbloody nonbilious vomiting over the past week.  Denies any diarrhea any focal abdominal discomfort.  States the symptoms are similar to previous bouts of cyclic vomiting.  States she has not used any cannabis for over 6 months, states that her current symptoms seem to be associated with her menstrual cycle, seem to bother her similar weight each month.  Denies any other complaints or concerns.  Objective:   Past Medical History:    Cannabis hyperemesis syndrome concurrent with and due to cannabis abuse (HCC)    Cyclic vomiting syndrome    Development delay    Failure to thrive (0-17)    Hyperemesis              No pertinent past surgical history.              No pertinent social history.            Review of Systems    Positive for stated Chief Complaint: Nausea/Vomiting/Diarrhea (X 2 weeks)    Other systems are as noted in HPI.  Constitutional and vital signs reviewed.      All other systems reviewed and negative except as noted above.    Physical Exam     ED Triage Vitals [09/28/24 2131]   /87   Pulse 95   Resp 16   Temp 97.3 °F (36.3 °C)   Temp src Temporal   SpO2 100 %   O2 Device None (Room air)       Current Vitals:   Vital Signs  BP: 146/75  Pulse: 104  Resp: 23  Temp: 97.3 °F (36.3 °C)  Temp src: Temporal  MAP (mmHg): 93    Oxygen Therapy  SpO2: 100 %  O2 Device: None (Room air)            Physical Exam      Physical Exam  Vitals signs and nursing note reviewed.   General:  Patient laying supine in the bed in no acute distress  Head: Normocephalic and atraumatic.   HEENT:  Mucous membranes are moist.   Cardiovascular:  Normal rate and regular rhythm.  No Edema  Pulmonary:  Pulmonary effort is normal.   Normal breath sounds. No wheezing, rhonchi or rales.   Abdominal: Soft nontender nondistended, normal bowel sounds, no guarding no rebound tenderness  Skin: Warm and dry  Neurological: Awake alert, speech is normal      ED Course     Labs Reviewed   CBC WITH DIFFERENTIAL WITH PLATELET - Abnormal; Notable for the following components:       Result Value    WBC 14.5 (*)     Neutrophil Absolute Prelim 13.32 (*)     Neutrophil Absolute 13.32 (*)     Lymphocyte Absolute 0.68 (*)     All other components within normal limits   COMP METABOLIC PANEL (14) - Abnormal; Notable for the following components:    Glucose 122 (*)     Potassium 3.3 (*)     BUN 8 (*)     Calcium, Total 10.7 (*)     Total Protein 8.5 (*)     Albumin 5.3 (*)     All other components within normal limits   HCG, BETA SUBUNIT (QUANT PREGNANCY TEST) - Normal   RAINBOW DRAW LAVENDER   RAINBOW DRAW LIGHT GREEN   RAINBOW DRAW BLUE   RAINBOW DRAW GOLD          ED Course as of 09/29/24 0058  ------------------------------------------------------------  Time: 09/29 0056  Value: EGFR: 113  Comment: (Reviewed)     No results found.         MDM      This is a 19-year-old woman here for evaluation of intractable vomiting over the past week.  Differential includes hyperemesis cyclic vomiting electrolyte abnormality.  Patient without focal abdominal tenderness labs significant for mild hypokalemia to 3.3 which was repleted.  Patient tolerating p.o., afebrile, symptoms similar to prior bouts of cyclic vomiting reports no cannabis for 6 months.  Discharged home with Zofran to have on hand for use as needed follow-up with GI return precautions discussed patient agreeable with plan                                   Medical Decision Making      Disposition and Plan     Clinical Impression:  1. Nausea and vomiting, unspecified vomiting type    2. Hypokalemia         Disposition:  Discharge  9/29/2024 12:56 am    Follow-up:  Laurence Suh MD  [address]  [address] [place] [id number]  [phone]    Follow up  Follow-up with your primary doctor or at the clinic listed for further evaluation.  Return to the emergency department immediately if your symptoms do not continue to improve or if you have any new concerning symptoms.          Medications Prescribed:  Current Discharge Medication List        START taking these medications    Details   !! ondansetron 4 MG Oral Tablet Dispersible Take 1 tablet (4 mg total) by mouth every 4 (four) hours as needed for Nausea.  Qty: 10 tablet, Refills: 0       !! - Potential duplicate medications found. Please discuss with provider.

## 2024-09-29 NOTE — ED INITIAL ASSESSMENT (HPI)
Pt arrives to ed w c/o vomiting for 2 weeks. Pt reports abd pain all throughout. Pt denies sick family members. States \"this usually happen close to my period and I'm about to get it\".

## 2024-09-29 NOTE — ED QUICK NOTES
Patient refusing cardiac monitor at this time, states there are too many wires and she is unable to rest.

## 2024-09-30 ENCOUNTER — APPOINTMENT (OUTPATIENT)
Dept: GENERAL RADIOLOGY | Facility: HOSPITAL | Age: 19
End: 2024-09-30
Payer: MEDICAID

## 2024-09-30 PROCEDURE — 71045 X-RAY EXAM CHEST 1 VIEW: CPT

## 2024-12-05 ENCOUNTER — APPOINTMENT (OUTPATIENT)
Dept: CT IMAGING | Facility: HOSPITAL | Age: 19
End: 2024-12-05
Attending: EMERGENCY MEDICINE

## 2024-12-05 ENCOUNTER — HOSPITAL ENCOUNTER (EMERGENCY)
Facility: HOSPITAL | Age: 19
Discharge: HOME | End: 2024-12-05
Attending: EMERGENCY MEDICINE

## 2024-12-05 VITALS
OXYGEN SATURATION: 100 % | HEIGHT: 63 IN | HEART RATE: 94 BPM | WEIGHT: 147 LBS | RESPIRATION RATE: 18 BRPM | SYSTOLIC BLOOD PRESSURE: 146 MMHG | TEMPERATURE: 98 F | DIASTOLIC BLOOD PRESSURE: 88 MMHG | BODY MASS INDEX: 26.05 KG/M2

## 2024-12-05 DIAGNOSIS — F12.90 CANNABINOID HYPEREMESIS SYNDROME: ICD-10-CM

## 2024-12-05 DIAGNOSIS — R11.2 CANNABINOID HYPEREMESIS SYNDROME: ICD-10-CM

## 2024-12-05 DIAGNOSIS — R11.2 NAUSEA AND VOMITING IN ADULT: Primary | ICD-10-CM

## 2024-12-05 LAB
ALBUMIN SERPL-MCNC: 5 G/DL (ref 3.2–4.8)
ALBUMIN/GLOB SERPL: 1.9 {RATIO} (ref 1–2)
ALP LIVER SERPL-CCNC: 87 U/L
ALT SERPL-CCNC: 18 U/L
ANION GAP SERPL CALC-SCNC: 11 MMOL/L (ref 0–18)
AST SERPL-CCNC: 26 U/L (ref ?–34)
B-HCG UR QL: NEGATIVE
BASOPHILS # BLD AUTO: 0.08 X10(3) UL (ref 0–0.2)
BASOPHILS NFR BLD AUTO: 0.4 %
BILIRUB SERPL-MCNC: 0.5 MG/DL (ref 0.3–1.2)
BILIRUB UR QL STRIP.AUTO: NEGATIVE
BUN BLD-MCNC: 7 MG/DL (ref 9–23)
CALCIUM BLD-MCNC: 9.8 MG/DL (ref 8.7–10.4)
CHLORIDE SERPL-SCNC: 106 MMOL/L (ref 98–112)
CLARITY UR REFRACT.AUTO: CLEAR
CO2 SERPL-SCNC: 25 MMOL/L (ref 21–32)
COLOR UR AUTO: YELLOW
CREAT BLD-MCNC: 0.81 MG/DL
EGFRCR SERPLBLD CKD-EPI 2021: 106 ML/MIN/1.73M2 (ref 60–?)
EOSINOPHIL # BLD AUTO: 0 X10(3) UL (ref 0–0.7)
EOSINOPHIL NFR BLD AUTO: 0 %
ERYTHROCYTE [DISTWIDTH] IN BLOOD BY AUTOMATED COUNT: 12.7 %
GLOBULIN PLAS-MCNC: 2.7 G/DL (ref 2–3.5)
GLUCOSE BLD-MCNC: 192 MG/DL (ref 70–99)
GLUCOSE UR STRIP.AUTO-MCNC: 50 MG/DL
HCG SERPL QL: NEGATIVE
HCT VFR BLD AUTO: 39.3 %
HGB BLD-MCNC: 13.1 G/DL
IMM GRANULOCYTES # BLD AUTO: 0.11 X10(3) UL (ref 0–1)
IMM GRANULOCYTES NFR BLD: 0.5 %
KETONES UR STRIP.AUTO-MCNC: 40 MG/DL
LEUKOCYTE ESTERASE UR QL STRIP.AUTO: 25
LIPASE SERPL-CCNC: 23 U/L (ref 12–53)
LYMPHOCYTES # BLD AUTO: 0.86 X10(3) UL (ref 1–4)
LYMPHOCYTES NFR BLD AUTO: 4.2 %
MCH RBC QN AUTO: 29.6 PG (ref 26–34)
MCHC RBC AUTO-ENTMCNC: 33.3 G/DL (ref 31–37)
MCV RBC AUTO: 88.9 FL
MONOCYTES # BLD AUTO: 0.5 X10(3) UL (ref 0.1–1)
MONOCYTES NFR BLD AUTO: 2.5 %
NEUTROPHILS # BLD AUTO: 18.72 X10 (3) UL (ref 1.5–7.7)
NEUTROPHILS # BLD AUTO: 18.72 X10(3) UL (ref 1.5–7.7)
NEUTROPHILS NFR BLD AUTO: 92.4 %
NITRITE UR QL STRIP.AUTO: NEGATIVE
OSMOLALITY SERPL CALC.SUM OF ELEC: 297 MOSM/KG (ref 275–295)
PH UR STRIP.AUTO: 6 [PH] (ref 5–8)
PLATELET # BLD AUTO: 367 10(3)UL (ref 150–450)
POTASSIUM SERPL-SCNC: 3.5 MMOL/L (ref 3.5–5.1)
PROT SERPL-MCNC: 7.7 G/DL (ref 5.7–8.2)
PROT UR STRIP.AUTO-MCNC: 70 MG/DL
RBC # BLD AUTO: 4.42 X10(6)UL
SODIUM SERPL-SCNC: 142 MMOL/L (ref 136–145)
SP GR UR STRIP.AUTO: 1.03 (ref 1–1.03)
UROBILINOGEN UR STRIP.AUTO-MCNC: NORMAL MG/DL
WBC # BLD AUTO: 20.3 X10(3) UL (ref 4–11)

## 2024-12-05 PROCEDURE — 80053 COMPREHEN METABOLIC PANEL: CPT | Performed by: EMERGENCY MEDICINE

## 2024-12-05 PROCEDURE — 81025 URINE PREGNANCY TEST: CPT

## 2024-12-05 PROCEDURE — 99284 EMERGENCY DEPT VISIT MOD MDM: CPT

## 2024-12-05 PROCEDURE — 85025 COMPLETE CBC W/AUTO DIFF WBC: CPT | Performed by: EMERGENCY MEDICINE

## 2024-12-05 PROCEDURE — 83690 ASSAY OF LIPASE: CPT | Performed by: EMERGENCY MEDICINE

## 2024-12-05 PROCEDURE — 99285 EMERGENCY DEPT VISIT HI MDM: CPT

## 2024-12-05 PROCEDURE — 74177 CT ABD & PELVIS W/CONTRAST: CPT | Performed by: EMERGENCY MEDICINE

## 2024-12-05 PROCEDURE — 96374 THER/PROPH/DIAG INJ IV PUSH: CPT

## 2024-12-05 PROCEDURE — 96361 HYDRATE IV INFUSION ADD-ON: CPT

## 2024-12-05 PROCEDURE — 81001 URINALYSIS AUTO W/SCOPE: CPT | Performed by: EMERGENCY MEDICINE

## 2024-12-05 PROCEDURE — 96375 TX/PRO/DX INJ NEW DRUG ADDON: CPT

## 2024-12-05 PROCEDURE — 84703 CHORIONIC GONADOTROPIN ASSAY: CPT | Performed by: EMERGENCY MEDICINE

## 2024-12-05 RX ORDER — ONDANSETRON 4 MG/1
4 TABLET, ORALLY DISINTEGRATING ORAL EVERY 4 HOURS PRN
Qty: 10 TABLET | Refills: 0 | Status: SHIPPED | OUTPATIENT
Start: 2024-12-05 | End: 2024-12-12

## 2024-12-05 RX ORDER — DIPHENHYDRAMINE HYDROCHLORIDE 50 MG/ML
25 INJECTION INTRAMUSCULAR; INTRAVENOUS ONCE
Status: COMPLETED | OUTPATIENT
Start: 2024-12-05 | End: 2024-12-05

## 2024-12-05 RX ORDER — METOCLOPRAMIDE HYDROCHLORIDE 5 MG/ML
10 INJECTION INTRAMUSCULAR; INTRAVENOUS ONCE
Status: COMPLETED | OUTPATIENT
Start: 2024-12-05 | End: 2024-12-05

## 2024-12-05 NOTE — ED INITIAL ASSESSMENT (HPI)
Pt presents to the ED with c/o vomiting x 2 weeks. Pt awake and alert, skin w/d,resps reg/unlabored.

## 2024-12-05 NOTE — ED QUICK NOTES
Patient able to ambulate to the restroom with steady gait. Unable to provide urine sample at this time. Patient will try again once her Bolus is completed.

## 2024-12-05 NOTE — ED PROVIDER NOTES
Patient Seen in: [facility] Emergency Department      History     Chief Complaint   Patient presents with    Nausea/Vomiting/Diarrhea     Stated Complaint: vomiting x 2 weeks    Subjective:   HPI      19-year-old female presents emergency room for evaluation of nausea and vomiting.  Patient has a history of cannabinol hyperemesis syndrome, patient states she has been vomiting for the last 2 weeks, patient admits that she continues to smoke marijuana daily.  Denies fevers or chills.  Does complain of abdominal pain, describes as dull.  Denies urinary symptoms.  Denies any fevers or chills  Denies diarrhea, constipation melena or hematochezia.    Objective:     Past Medical History:    Cannabis hyperemesis syndrome concurrent with and due to cannabis abuse (HCC)    Cyclic vomiting syndrome    Development delay    Failure to thrive (0-17)    Hyperemesis              Past Surgical History:   Procedure Laterality Date    Excis lumbar disk,one level      Other accessory      pt reports feeding tube when she was 12 but unsure why                Social History     Socioeconomic History    Marital status: Single   Tobacco Use    Smoking status: Never     Passive exposure: Never    Smokeless tobacco: Current   Vaping Use    Vaping status: Every Day   Substance and Sexual Activity    Alcohol use: Not Currently    Drug use: Yes     Types: Cannabis   Social History Narrative    ** Merged History Encounter **          Social Drivers of Health     Financial Resource Strain: Low Risk  (2/8/2024)    Financial Resource Strain     Difficulty of Paying Living Expenses: Not very hard     Med Affordability: No   Food Insecurity: No Food Insecurity (2/4/2024)    Food Insecurity     Food Insecurity: Never true   Transportation Needs: No Transportation Needs (2/8/2024)    Transportation Needs     Lack of Transportation: No   Housing Stability: Low Risk  (2/4/2024)    Housing Stability     Housing Instability: No                   Physical Exam     ED Triage Vitals [12/05/24 1143]   /88   Pulse 94   Resp 18   Temp 97.5 °F (36.4 °C)   Temp src Oral   SpO2 100 %   O2 Device None (Room air)       Current Vitals:   Vital Signs  BP: 146/88  Pulse: 94  Resp: 18  Temp: 97.5 °F (36.4 °C)  Temp src: Oral    Oxygen Therapy  SpO2: 100 %  O2 Device: None (Room air)        Physical Exam  GENERAL: Patient is awake, alert, in no acute distress.  HEENT:  no scleral icterus.  Mucous membranes are dry   NECK: Neck is supple, there is no nuchal rigidity.    HEART: Regular rate and rhythm, no murmurs.  LUNGS: Clear to auscultation bilaterally.  No Rales, no rhonchi, no wheezing, no stridor.  ABDOMEN: Soft, nondistended, mid to upper abdominal tender, bowel sounds are present, no rebound, no rigidity, no guarding.no pulsatile masses. No CVA tenderness  EXTREMITIES: No peripheral edema    ED Course     Labs Reviewed   COMP METABOLIC PANEL (14) - Abnormal; Notable for the following components:       Result Value    Glucose 192 (*)     BUN 7 (*)     Calculated Osmolality 297 (*)     Albumin 5.0 (*)     All other components within normal limits   CBC WITH DIFFERENTIAL WITH PLATELET - Abnormal; Notable for the following components:    WBC 20.3 (*)     Neutrophil Absolute Prelim 18.72 (*)     Neutrophil Absolute 18.72 (*)     Lymphocyte Absolute 0.86 (*)     All other components within normal limits   URINALYSIS, ROUTINE - Abnormal; Notable for the following components:    Glucose Urine 50 (*)     Ketones Urine 40 (*)     Blood Urine 1+ (*)     Protein Urine 70 (*)     Leukocyte Esterase Urine 25 (*)     WBC Urine 11-20 (*)     Bacteria Urine Rare (*)     Squamous Epi. Cells Few (*)     All other components within normal limits   LIPASE - Normal   HCG, BETA SUBUNIT, QUAL - Normal   POCT PREGNANCY URINE - Normal                   MDM        Differential diagnosis before testing includes but not limited to electrolyte abnormality, pregnancy, pancreatitis,  appendicitis, cannabinoid hyperemesis syndrome, which is a medical condition that poses a threat to life/function    Past Medical History/comorbidities-as noted in HPI    Radiographic images  I personally reviewed the radiographs and my individual interpretation shows CT abdomen, no free air  I also reviewed the official reports that showed CT findings raise concern for mild or chronic nonspecific colitis.  Improved or was pronounced when compared to prior study.  No additional diagnostic abnormality.      Course of Events during Emergency Room Visit include patient given IV fluid hydration, Reglan and Benadryl.  CBC white count 20.3 hemoglobin 13.1 platelet 367.  Negative pregnancy test.  Lipase 23.  Chemistry was unremarkable other than glucose of 192.  Urinalysis negative nitrate 25 leukocyte esterase rare bacteria with few epithelial cells, patient denies any symptoms will send culture.  On reevaluation abdomen is soft nontender nonsurgical.  Patient is tolerating oral fluids.  I discussed with patient the importance of refraining from marijuana as this does appear to be the cause of her hyperemesis.  Patient to follow-up with primary care and return to ER if any change or worsening symptoms    Shared decision making was utilized           Disposition:      Discharge  I have discussed with the patient the results of test, differential diagnosis, treatment plan, warning signs and symptoms which should prompt immediate return.  They expressed understanding of these instructions and agrees to the following plan provided.  They were given written discharge instructions and agrees to return for any concerns and voiced understanding and all questions were answered.    Note to patient: The 21st Century Cures Act makes medical notes like these available to patients in the interest of transparency. However, this is a medical document intended as peer to peer communication. It is written in medical language and may  contain abbreviations or verbiage that are unfamiliar. It may appear blunt or direct. Medical documents are intended to carry relevant information, facts as evident, and the clinical opinion of the practitioner.                 Medical Decision Making      Disposition and Plan     Clinical Impression:  1. Nausea and vomiting in adult    2. Cannabinoid hyperemesis syndrome         Disposition:  Discharge  12/5/2024  1:52 pm    Follow-up:  Kosta Garrison DO  [address] [place] [id number]  [phone]    Follow up in 2 day(s)            Medications Prescribed:  Current Discharge Medication List              Supplementary Documentation:

## 2024-12-08 NOTE — PLAN OF CARE
Assumed care 0730. Alert and oriented  x4. RA  No tele. Clear liquid diet Advance as tolerated. Up at [facility]. Independent  Denies pain. PIV infusing  0.9 @ 100 ml/hr. Electrolyte NC protocol . 9:10: gave patient apple juice and lime soda with warm blanket. PRN Zofran given. 9:23: Tech called pt is having an emesis at bedside. Pt drank both drinks. RN educated patient that she should drink slowly.
Assumed care 0730. Alert and oriented x4.  RA. No tele   C/o wisdom tooth pain. PIV infusing 0.9 @ 100 ml/hr. Soft diet. Suppliments BID. Increased appetite. Electrolyte Non cardiac protocol  Denies nausea. Up at [facility]. Wants to go home today. Needs a work note. Continue to monitor pt.
minimum assist (75% patients effort)

## 2025-05-21 NOTE — ED QUICK NOTES
Patient ID: Pillo Guerin is a 64 y.o. male    Primary Care Provider: Rocio Harris, APRN-CNP    DIAGNOSIS AND STAGING  Stage IVB (cT3 cN3 cM1c) poorly differentiated adenocarcinoma of RUL diagnosed on 08/03/23     SITES OF DISEASE  RUL  Supraclavicular nodes  Contralateral lung  Right gluteal muscle   Brain right occipital lobe      MOLECULAR GENOMICS  MICROSATELLITE STATUS: Microsatellite Stable (MAGDIEL)     DISEASE ASSOCIATED GENOMIC FINDINGS:   KRAS p.G13D ([id number] c.38G>A)   PIK3CA p.E542K ([id number] c.1624G>A)   TP53 p.E285K ([id number] c.853G>A)     DISEASE RELEVANT ALTERATIONS NOT DETECTED:   Negative for ALK fusion.   Negative for BRAF V600E.   Negative for EGFR sensitizing mutation.   Negative for KRAS G12C.   Negative for MET exon 14 skipping mutation.   Negative for NTRK fusion.   Negative for RET fusion.   Negative for ROS1 fusion.     PD-L1 TPS 80%     PRIOR THERAPIES  GKRS to right occipital lobe on  24 Gy in 1 fraction on 10/17/23      CURRENT THERAPY  Single agent pembrolizumab since 08/31/23    CURRENT ONCOLOGICAL PROBLEMS  Resolved GIII ICI colitis     HISTORY OF PRESENT ILLNESS  This is a pt with PMH of EtOH use disorder, CVA, CAD c/b NSTEMI s/p 2 stents and stage IVB ([id number]) poorly differentiated adenocarcinoma of RUL diagnosed on 08/03/23.      Patient was initially diagnosed 08/2022 after incidentally found RUL lesion was worked up during admission for hyponatremia presumed to be 2/2 beer potomania. He was then seen by his pulmonologist Dr. Callejas who ordered PET and MRI but this was not obtained  nor were multiple attempts to schedule for oncologic NPV.     I saw him in August 2023 and he endorsed haviing lost 15 lbs since stroke April 2022. His appetite is good, denies dysgeusia. He has slowed down a bit since he had his stroke. He can walk without becoming dyspneic but is limited by his stroke.    On 08/12/23 the patient underwent a bronchoscopy with EBUS:  Accession #: [id number]  Patient able to tolerate potasium pills and ice water. Patient also given ice water as requested.      Date of Procedure:8/3/2022   Date Reported: 8/9/2022   Date Received: 8/3/2022   Date of Birth / Sex 1960 (Age: 62) / M   Race: WHITE   Submitting Physician: DARYN YOUNG MD   Attending Physician: MD HEVER BROWNE MD   Procedures/Addenda Present     Other External #     FINAL CYTOLOGICAL INTERPRETATION     A. FINE NEEDLE ASPIRATION LYMPH NODE, L 4, CYTOLOGY AND CELL BLOCK:   NO MALIGNANT CELLS IDENTIFIED.   LYMPHOID SAMPLE IS PRESENT.     Note: Cell block shows presence of lymphoid cells along with bronchial cells contamination.     B. FINE NEEDLE ASPIRATION LYMPH NODE, STATION 7, CYTOLOGY AND CELL BLOCK:   NO MALIGNANT CELLS IDENTIFIED.   LYMPHOID SAMPLE IS PRESENT.     C. FINE NEEDLE ASPIRATION LYMPH NODE, R 11, CYTOLOGY AND CELL BLOCK:   POORLY DIFFERENTIATED CARCINOMA, CONSISTENT WITH ADENOCARCINOMA, SEE NOTE.     Note: A limited panel of immunohistochemical stains performed showed neoplastic   cells staining strongly and diffusely positive with TTF-1 and negative with   p40. Findings are consistent with above.     Staff pathologist: Reviewed by Dr. Ellyn Valles.     Molecular testing has been ordered and results will be issued in an addendum.     The cell block; C1 contains moderate tumor cellularity representing roughly 70 % of all nucleated cells.       D. BRONCHO-ALVEOLAR LAVAGE OF RIGHT UPPER LOBE:   RARE ATYPICAL CELLS ARE PRESENT.     08/16/23: PET scan demonstrates multiple RUL FDG avid nodules as well as contra-lateral lung nodules, right hilar, mediastinal and supraclavicular nodes consistent with neoplastic involvement. There is also a large FDG avid right gluteal mass extending into the right SIJ and iliac bone.     08/16/23: MRI brain shows a 8 mm right occipital lobe metastasis with mild associated edema.     08/31/23: Single agent pembrolizumab begins     10/17/23: GKRS to right occipital lobe on  24 Gy in 1 fraction     06/06/24: grade 3 immune-related diarrhea with  episodes of incontinence despite Imodium   Rx prednisone 40 mg/day provided today      PAST MEDICAL HISTORY  CAD s/p 2 stents, CVA, Dupuytren's contracture   BPH  Lumbar radiculopathy   Ir-diarrhea (pembrolizumab) treated with prednisone months of May and      SURGICAL HISTORY  None      SOCIAL HISTORY  Born in [place], lives in [place] with 2 roommates.   Brother Salas is NOK. Drinks 2 24 oz beers a day.   + Tobacco - 50 pack year smoking history, active.   Rare cannabis, acid use in 70s, no additional illicits.    Was working at [employer] in [place] before stroke.      FAMILY HISTORY  Mother  of melanoma in      CURRENT MEDS REVIEWED    Current Outpatient Medications:     amLODIPine (Norvasc) 5 mg tablet, Take 0.5 tablets (2.5 mg) by mouth once daily., Disp: , Rfl:     cholecalciferol (Vitamin D3) 25 mcg (1000 units) tablet, Take 1 tablet (1,000 Units) by mouth once daily., Disp: 30 tablet, Rfl: 11    diclofenac sodium (Voltaren) 1 % gel, , Disp: , Rfl:     FeroSuL 325 mg (65 mg iron) tablet, , Disp: , Rfl:     gabapentin (Neurontin) 300 mg capsule, Take 2 capsules (600 mg) by mouth 2 times a day., Disp: 120 capsule, Rfl: 2    Incruse Ellipta 62.5 mcg/actuation inhalation, INHALE 1 PUFF (62.5 MCG) ONCE DAILY, Disp: 30 each, Rfl: 11    ketoconazole (NIZOral) 2 % cream, , Disp: , Rfl:     losartan (Cozaar) 50 mg tablet, Take 1 tablet (50 mg) by mouth., Disp: , Rfl:     morphine CR (MS Contin) 15 mg 12 hr tablet, Take 1 tablet (15 mg) by mouth 3 times a day. Do not crush, chew, or split., Disp: 90 tablet, Rfl: 0    omeprazole (PriLOSEC) 20 mg DR capsule, Do not crush or chew. Take it 30 minutes before breakfast., Disp: 30 capsule, Rfl: 11    oxyCODONE (Roxicodone) 10 mg immediate release tablet, Take 1 tablet (10 mg) by mouth every 6 hours if needed for severe pain (7 - 10)., Disp: 120 tablet, Rfl: 0    predniSONE (Deltasone) 5 mg tablet, Take 1 tablet (5 mg) by mouth once daily., Disp: 30  tablet, Rfl: 2    tadalafil (Cialis) 10 mg tablet, Take 1 tablet (10 mg) by mouth once daily as needed., Disp: , Rfl:     ticagrelor (Brilinta) 60 mg tablet, Take 1 tablet (60 mg) by mouth 2 times a day., Disp: 60 tablet, Rfl: 11    triamcinolone (Kenalog) 0.1 % ointment, Apply topically 2 times a day., Disp: 80 g, Rfl: 1    atorvastatin (Lipitor) 80 mg tablet, TAKE 1 TABLET BY MOUTH ONCE DAILY, Disp: 30 tablet, Rfl: 1    docusate sodium (Colace) 100 mg capsule, Take 2 capsules (200 mg) by mouth as needed at bedtime for constipation. You only need to take this if you feel constipated. (Patient not taking: Reported on 5/21/2025), Disp: 120 capsule, Rfl: 0    loperamide (Imodium A-D) 2 mg tablet, Take it as instructed for diarrhea (Patient not taking: Reported on 5/21/2025), Disp: 60 tablet, Rfl: 3    metoprolol tartrate (Lopressor) 25 mg tablet, TAKE 1 TABLET BY MOUTH TWO TIMES A DAY, Disp: 60 tablet, Rfl: 1    naloxone (Narcan) 4 mg/0.1 mL nasal spray, Administer 1 spray (4 mg) into affected nostril(s) if needed for opioid reversal. May repeat every 2-3 minutes if needed, alternating nostrils, until medical assistance becomes available. (Patient not taking: Reported on 5/21/2025), Disp: 2 each, Rfl: 0    nitroglycerin (Nitrostat) 0.4 mg SL tablet, DISSOLVE 1 TABLET UNDER TONGUE EVERY 5 MINUTES FOR 3 DOSES AS NEEDED FOR CHEST PAIN. IF PAIN PERSISTS DIAL 911. (Patient not taking: Reported on 5/21/2025), Disp: 25 tablet, Rfl: 1    Current Facility-Administered Medications:     acetaminophen (Tylenol) tablet 650 mg, 650 mg, oral, q4h PRN, Angelica Munoz MD    HYDROmorphone (Dilaudid) injection 0.4 mg, 0.4 mg, intravenous, PRN, Angelica Munoz MD, 0.4 mg at 10/17/23 1230    HYDROmorphone (Dilaudid) injection 0.4 mg, 0.4 mg, intravenous, PRN, Angelica Munoz MD, 0.4 mg at 10/17/23 0830    ibuprofen tablet 400 mg, 400 mg, oral, PRN, Angelica Munoz MD    midazolam (Versed) injection 0.5 mg, 0.5 mg, intravenous,  PRN, Angeliac Munoz MD, 0.5 mg at 10/17/23 1230    midazolam (Versed) injection 0.5 mg, 0.5 mg, intravenous, PRN, Angelica Munoz MD, 0.5 mg at 10/17/23 0830    ondansetron (Zofran) injection 4 mg, 4 mg, intravenous, q4h PRN, Angelica Munoz MD    oxyCODONE (Roxicodone) immediate release tablet 10 mg, 10 mg, oral, q4h PRN, Angelica Munoz MD      ALLERGIES REVIEWED     SUBJECTIVE: Patient doing well today. He is walking outside and eating well. He would like to inquire about starting Ensure.     He denies any fevers, chills or night sweats. No cough, chest pain or shortness of breath. No nausea or vomiting. No constipation or diarrhea. No urinary symptoms. No rash. No neuropathy.     OBJECTIVE:  /57 (BP Location: Right arm, Patient Position: Sitting, BP Cuff Size: Adult)   Pulse 57   Temp 36.3 °C (97.3 °F) (Core)   Resp 18   Wt 60.3 kg (133 lb)   SpO2 93%   BMI 19.64 kg/m²     Wt Readings from Last 5 Encounters:   25 60.3 kg (133 lb)   25 60.1 kg (132 lb 8 oz)   25 62 kg (136 lb 9.6 oz)   25 61.7 kg (135 lb 14.6 oz)   25 62.8 kg (138 lb 6.4 oz)     Physical Exam:  ECO  Pain: 7/10 mainly left leg  Constitutional: Well developed, awake/alert/oriented x3, no distress, alert and cooperative  Eyes: PER. sclera anicteric  ENMT: Oral mucosa moist, no lesions or thrush identified  Respiratory/Thorax: Breathing is non-labored. Lungs are clear to auscultation bilaterally. No adventitious breath sounds  Cardiovascular: S1-S2. Regular rate and rhythm. No murmurs, rubs, or gallops appreciated  Gastrointestinal: Abdomen soft nontender, nondistended, normal active bowel sounds.  Musculoskeletal: ROM intact, no joint swelling, normal strength  Extremities: normal extremities, no cyanosis, no edema, no clubbing  Lymphatics: no palpable lymphadenopathy  Skin: no rash  Neurologic: alert and oriented x3. Nonfocal exam. No myoclonus  Psychological: Pleasant, appropriate and easily  engaged     Results  Lab Results   Component Value Date    WBC 8.9 05/20/2025    HGB 13.0 (L) 05/20/2025    HCT 40.5 (L) 05/20/2025    MCV 66 (L) 05/20/2025     05/20/2025      Lab Results   Component Value Date    NEUTROABS 6.18 05/20/2025      Lab Results   Component Value Date    GLUCOSE 92 05/20/2025    CALCIUM 9.2 05/20/2025     (L) 05/20/2025    K 4.4 05/20/2025    CO2 26 05/20/2025     05/20/2025    BUN 12 05/20/2025    CREATININE 0.71 05/20/2025    MG 2.28 08/06/2024     Lab Results   Component Value Date    ALT 18 05/20/2025    AST 17 05/20/2025    ALKPHOS 48 05/20/2025    BILITOT 0.9 05/20/2025    BILIDIR 0.3 07/31/2022      Lab Results   Component Value Date    ACTH 35.1 04/08/2025    CORTISOL 12.1 05/20/2025    TSH 6.00 (H) 05/20/2025    FREET4 1.17 05/20/2025       Diagnostic Results   CT CAP 05/16/25  IMPRESSION:  Lung cancer restaging scan. When compared to the prior CT dated  03/12/2025, there is stable size of the dominant right upper lobe  mass with increasing internal cavitary component which may be  posttreatment related and attention on follow-up imaging is  recommended. Stable osseous metastatic disease. No definite new sites  of metastatic disease. Additional stable chronic and incidental  findings described above.     MRI Brain 03/12/2025  IMPRESSION:  1. No mass or enhancement suggestive of metastatic disease is noted.      2. The parenchymal abnormalities are similar to the prior MR and may be secondary to chronic ischemic changes. No acute infarct or hemorrhage is noted.    Assessment/Plan   Lung cancer (Multi), Clinical: Stage IVB (cT3, cN3, cM1c)  Mr. Pillo Guerin is a 63 YO with Stage IVB ([id number]) poorly differentiated adenocarcinoma of RUL, TPS 80%, lack of actionable genomic alterations noted.    He was started on single agent pembrolizumab since 08/31/23 - however had to be held in May 2024 due to grade III ICI colitis which has completed resolved.     He has  now completed 20 cycles pembrolizumab. CT scans from 05/25 personally reviewed showing stable primary lesion with new cavitation suggestive of response. We will continue with pembrolizumab and obtain scans in August.    #Stage IVB NSCLC with early, slow progression and no options in second line  -Continue pembrolizumab  -Scans August    #Grade III ICI Diarrhea - resolved    #Pruritus, immune related  Continue moisturizing BUE with CeraVe and triamcinolone as needed     #Right occipital lobe 8 mm metastasis   S/p GKRS 24 Gy in 1 fraction on 10/17/23. Stable MRI 3/12/2025  Repeat MRI brain every 3-4 months per Rad Onc     #Pain on right gluteal region-  Due to large centrally necrotic and peripherally hypermetabolic mass in the right lower paramediastinal muscle extending into the adjacent subcutaneous tissue and superior right gluteal muscle with involvement of the superior aspect of the right iliac  bone, right florencio sacrum -   He has been taking gabapentin for pain (controlled) in LLE that is chronic   -Increased oxycodone to 10 mg every 6 PRN. Patient has established with supportive oncology    #Advanced Care Planning  -Discussed slow nature of progression and challenges with treatment in second line given PS. He is close with brother and roommate/ex gf Radha     #Stroke in April 2022 and hx of CAD  -   Stable -   On ASA and ticagrelor      #HTN - on amlodipine      #HLD - atorvastatin    #Microcytic Anemia - Chronic in nature. Iron panel and ferritin found to be WNL.  Could send hemoglobin electrophoresis if anemia becomes a process.   Has never been told or heard about family history of thalassemia or other blood disorders.     Jorge Gray MD  Thoracic Medical Oncology   [id number] [place], [place] [address]  Phone: [phone]

## (undated) NOTE — LETTER
Date & Time: 8/6/2022, 7:47 PM  Patient: Benny Cramer  Encounter Provider(s):    Solitario Melendez MD       To Whom It May Concern:    Benny Cramer was seen and treated in our department on 8/6/2022. She can return to work 8/7/2022.     If you have any questions or concerns, please do not hesitate to call.        _____________________________  Physician/APC Signature

## (undated) NOTE — ED AVS SNAPSHOT
Parent/Legal Guardian Access to the Online [employer] Record of a Patient 15to 16Years Old  Return completed form by Secure email to [facility]/Medical Records Department: [email or web address]. [email or web address].     Requirements and Procedures   Under [facility] MyChart ID and password with another person, that person may be able to view my or my child’s health information, and health information about someone who has authorized me as a MyChart proxy.    ·  I agree that it is my responsibility to select a confident Sign-Up Form and I agree to its terms.        Authorization Form     Please enter Patient’s information below:   Name (last, first, middle initial) __________________________________________   Gender  Male  Female    Last 4 Digits of Social Security Number Parent/Legal Guardian Signature                                  For Patient (1517 years of age)  I agree to allow my parent/legal guardian, named above, online access to my medical information currently available and that may become available as a result

## (undated) NOTE — ED AVS SNAPSHOT
Parent/Legal Guardian Access to the Online [employer] Record of a Patient 15to 16Years Old  Return completed form by Secure email to [facility]/Medical Records Department: [email or web address].     Requirements and Procedures   Under [facility] MyChart ID and password with another person, that person may be able to view my or my child’s health information, and health information about someone who has authorized me as a MyChart proxy.    ·  I agree that it is my responsibility to select a confident Sign-Up Form and I agree to its terms.        Authorization Form     Please enter Patient’s information below:   Name (last, first, middle initial) __________________________________________   Gender  Male  Female    Last 4 Digits of Social Security Number Parent/Legal Guardian Signature                                  For Patient (1517 years of age)  I agree to allow my parent/legal guardian, named above, online access to my medical information currently available and that may become available as a result

## (undated) NOTE — LETTER
Date: 8/16/2022    Patient Name: Swapna Delgado          To Whom it may concern: The above patient was seen at the [facility] for treatment of a medical condition. This patient should be excused from attending work from 8/8/2022 through 8/10/2022. The patient may return to work on 8/11/2022 with no limitations. Call with any questions or concerns [phone].         Sincerely,    ESTHER Willingham

## (undated) NOTE — ED AVS SNAPSHOT
Parent/Legal Guardian Access to the Online [employer] Record of a Patient 15to 16Years Old  Return completed form by Secure email to [facility] Records Department: [email or web address].     Requirements and Procedures   Under [facility] MyChart ID and password with another person, that person may be able to view my or my child’s health information, and health information about someone who has authorized me as a MyChart proxy.    ·  I agree that it is my responsibility to select a confident Sign-Up Form and I agree to its terms.        Authorization Form     Please enter Patient’s information below:   Name (last, first, middle initial) __________________________________________   Gender  Male  Female    Last 4 Digits of Social Security Number Parent/Legal Guardian Signature                                  For Patient (1317 years of age)  I agree to allow my parent/legal guardian, named above, online access to my medical information currently available and that may become available as a result

## (undated) NOTE — LETTER
Date & Time: 8/29/2021, 6:56 PM  Patient: Sung Simpson  Encounter Provider(s):    Rose Thornton MD       To Whom It May Concern:    Sung Simpson was seen and treated in our department on 8/29/2021. She can return to school/work.   Her

## (undated) NOTE — LETTER
Date & Time: 10/10/2021, 3:02 PM  Patient: Kareem Wade  Encounter Provider(s):    Jayleen Hernandez MD       To Whom It May Concern:    Kareem Wade was seen and treated in our department on 10/10/2021.   Patient has no signs or symptoms

## (undated) NOTE — ED AVS SNAPSHOT
Parent/Legal Guardian Access to the Online [employer] Record of a Patient 15to 16Years Old  Return completed form by Secure email to [facility] Records Department: [email or web address]     Requirements and Procedures   Under [employer] MyChart ID and password with another person, that person may be able to view my or my child’s health information, and health information about someone who has authorized me as a MyChart proxy.    ·  I agree that it is my responsibility to select a confident Sign-Up Form and I agree to its terms.        Authorization Form     Please enter Patient’s information below:   Name (last, first, middle initial) __________________________________________   Gender  Male  Female    Last 4 Digits of Social Security Number Parent/Legal Guardian Signature                                  For Patient ([age] years of age)  I agree to allow my parent/legal guardian, named above, online access to my medical information currently available and that may become available as a result

## (undated) NOTE — ED AVS SNAPSHOT
Parent/Legal Guardian Access to the Online [employer] Record of a Patient 15to 16Years Old  Return completed form by Secure email to [facility] Records Department: [email or web address]     Requirements and Procedures   Under [facility] ·  I understand that [address] is intended as a secure online source of confidential medical information.  If I share my MyChart ID and password with another person, that person may be able to view my or my child’s health information, and health information a · This form does not substitute as an Authorization to Release health information to a designated proxy by any other method. The purpose of this Minor Proxy form is for access to the [employer] portal information.     By signing below, I acknowledge that I ha I have read and understand the requirements and procedures for accessing my child’s medical record information online as provided  on page one of this document titled, Parent/Legal Guardian Access to the Online MyChart Record of a Patient [address] to [address]